# Patient Record
Sex: FEMALE | Race: WHITE | NOT HISPANIC OR LATINO | Employment: FULL TIME | URBAN - METROPOLITAN AREA
[De-identification: names, ages, dates, MRNs, and addresses within clinical notes are randomized per-mention and may not be internally consistent; named-entity substitution may affect disease eponyms.]

---

## 2017-01-22 PROBLEM — L30.9 DERMATITIS DUE TO UNKNOWN CAUSE: Status: ACTIVE | Noted: 2017-01-22

## 2017-01-22 PROBLEM — F41.9 ANXIETY, MILD: Status: ACTIVE | Noted: 2017-01-22

## 2017-03-30 PROBLEM — G89.29 CHRONIC MIDLINE LOW BACK PAIN WITHOUT SCIATICA: Status: ACTIVE | Noted: 2017-03-30

## 2017-03-30 PROBLEM — F43.9 STRESS: Status: ACTIVE | Noted: 2017-03-30

## 2017-03-30 PROBLEM — M54.50 CHRONIC MIDLINE LOW BACK PAIN WITHOUT SCIATICA: Status: ACTIVE | Noted: 2017-03-30

## 2017-08-10 ENCOUNTER — TELEPHONE (OUTPATIENT)
Dept: PHYSICAL MEDICINE AND REHAB | Facility: CLINIC | Age: 67
End: 2017-08-10

## 2017-08-10 NOTE — TELEPHONE ENCOUNTER
Spoke with patient advised that I do not have any orders in computer for emg for her. I sent a fax to dr Tad Delgado' office requesting an order for emg be sent over for patient.

## 2017-08-10 NOTE — TELEPHONE ENCOUNTER
----- Message from Willa Flaherty sent at 8/10/2017 10:13 AM CDT -----  Dr. Tad Delgado said he referred patient to office to have a nerve conductive study on her left hand. She has been waiting for a week to hear from office. Please call back to schedule at [phone].

## 2017-08-10 NOTE — TELEPHONE ENCOUNTER
Patient called states there is supposed to be an order for a nerve conduction/ emg for her. I do not see one in EPIC please place order and I can schedule. Or fax to [phone]

## 2017-08-14 DIAGNOSIS — M54.2 CERVICALGIA: Primary | ICD-10-CM

## 2017-08-17 ENCOUNTER — PROCEDURE VISIT (OUTPATIENT)
Dept: PHYSICAL MEDICINE AND REHAB | Facility: CLINIC | Age: 67
End: 2017-08-17
Payer: COMMERCIAL

## 2017-08-17 DIAGNOSIS — M54.2 CERVICALGIA: ICD-10-CM

## 2017-08-17 DIAGNOSIS — G56.03 CARPAL TUNNEL SYNDROME ON BOTH SIDES: Primary | ICD-10-CM

## 2017-08-17 DIAGNOSIS — G62.9 PERIPHERAL POLYNEUROPATHY: ICD-10-CM

## 2017-08-17 PROCEDURE — 95911 NRV CNDJ TEST 9-10 STUDIES: CPT | Mod: 26,,, | Performed by: PHYSICAL MEDICINE & REHABILITATION

## 2017-08-17 PROCEDURE — 95886 MUSC TEST DONE W/N TEST COMP: CPT | Mod: 26,,, | Performed by: PHYSICAL MEDICINE & REHABILITATION

## 2017-08-18 NOTE — PROCEDURES
[facility]  [address] [facility] [address]  [place] [address]             Full Name: CARLOS LYNN Gender: Female  Patient ID: [id number] YOB: 1950  History: Pt reports hx of carpal tunnel syndrome surgery on both sides many years ago with persistent paresthesias of the left hand. She also reports some paresthesias in the shoulders.      Visit Date: 8/17/2017 09:44  Age: 67 Years 4 Months Old  Examining Physician: JENI  Referring Physician: TONIA YEUNG MD      Sensory NCS      Nerve / Sites Rec. Site Onset Lat Peak Lat NP Amp PP Amp Segments Distance Velocity     ms ms µV µV  cm m/s   L Median - Digit III (Antidromic)      Wrist Dig III NR NR NR NR Wrist - Dig III 14 NR      Mid palm Dig III NR NR NR NR Mid palm - Dig III 7 NR   R Median - Digit III (Antidromic)      Wrist Dig III 4.43 5.21 4.0 17.9 Wrist - Dig III 14 32      Mid palm Dig III 1.88 2.55 16.3 6.9 Mid palm - Dig III 7 37   L Ulnar - Digit V (Antidromic)      Wrist Dig V 3.02 3.91 16.5 10.9 Wrist - Dig V 14 46   R Ulnar - Digit V (Antidromic)      Wrist Dig V 2.92 3.80 11.7 13.7 Wrist - Dig V 14 48   L Radial - Anatomical snuff box (Forearm)      Forearm Wrist 1.93 2.81 23.4 10.8 Forearm - Wrist 10 52   L Sural - Ankle (Calf)      Calf Ankle NR NR NR NR Calf - Ankle 14 NR       Motor NCS      Nerve / Sites Muscle Latency Amplitude Amp % Duration Segments Distance Lat Diff Velocity     ms mV % ms  cm ms m/s   R Median - APB      Wrist APB 6.46 5.5 100 7.03 Wrist - APB 8        Elbow APB 11.51 9.8 180 7.34 Elbow - Wrist 24 5.05 48   L Median - APB      Wrist APB 11.88 1.6 100  Wrist - APB 8        Elbow APB 20.89 1.0 66.6 12.50 Elbow - Wrist 24 9.01 27   R Ulnar - ADM      Wrist ADM 3.54 6.9 100 6.56 Wrist - ADM 8        B.Elbow ADM 6.98 5.8 82.9 7.03 B.Elbow - Wrist 17 3.44 49      A.Elbow ADM 8.96 5.7 81.6 6.41 A.Elbow - B.Elbow 10 1.98 51   L Ulnar - ADM      Wrist ADM 3.39 7.4 100 7.34 Wrist - ADM 8        B.Elbow ADM 6.77 6.6  88.9 7.86 B.Elbow - Wrist 17 3.39 50      A.Elbow ADM 8.80 6.4 86.6 7.97 A.Elbow - B.Elbow 10 2.03 49       EMG         EMG Summary Table     Spontaneous MUAP Recruitment   Muscle IA Fib PSW Fasc H.F. Amp Dur. PPP Pattern   L. Deltoid N None None None None N N N N   L. Biceps brachii N None None None None N N N N   L. Triceps brachii N None None None None N N N N   L. Pronator teres N None None None None N N N N   L. First dorsal interosseous N None None None None N N N N   L. Abductor pollicis brevis N None 1+ None None N 1+ N N   L. Cervical paraspinals N None None None None       R. Deltoid N None None None None N N N N   R. Biceps brachii N None None None None N N N N   R. Triceps brachii N None None None None N N N N   R. Pronator teres N None None None None N N N N   R. First dorsal interosseous N None None None None N N N N   R. Abductor pollicis brevis N None None None None N N N N   R. Cervical paraspinals N None None None None           Summary    The motor conduction test was performed on 4 nerve(s). The results were normal in 2 nerve(s): R Ulnar - ADM, L Ulnar - ADM. Results outside the specified normal range were found in 2 nerve(s), as follows:   In the R Median - APB study  o the take off latency result was increased for Wrist stimulation  o the take off velocity result was reduced for Elbow - Wrist segment   In the L Median - APB study  o the take off latency result was increased for Wrist stimulation  o the peak amplitude result was reduced for Wrist stimulation  o the take off velocity result was reduced for Elbow - Wrist segment    The sensory conduction test was performed on 6 nerve(s). The results were normal in 1 nerve(s): L Radial - Anatomical snuff box (Forearm). Results outside the specified normal range were found in 5 nerve(s), as follows:   In the L Median - Digit III (Antidromic) study  o the response was considered absent for Wrist stimulation  o the response was considered absent  for Mid palm stimulation   In the R Median - Digit III (Antidromic) study  o the peak latency result was increased for Wrist stimulation  o the peak amplitude result was reduced for Wrist stimulation   In the L Ulnar - Digit V (Antidromic) study  o the peak latency result was increased for Wrist stimulation   In the R Ulnar - Digit V (Antidromic) study  o the peak latency result was increased for Wrist stimulation   In the L Sural - Ankle (Calf) study  o the response was considered absent for Calf stimulation    The needle EMG examination was performed in 14 muscles. It was normal in 13 muscle(s): L. Deltoid, L. Biceps brachii, L. Triceps brachii, L. Pronator teres, L. First dorsal interosseous, L. Cervical paraspinals, R. Deltoid, R. Biceps brachii, R. Triceps brachii, R. Pronator teres, R. First dorsal interosseous, R. Abductor pollicis brevis, R. Cervical paraspinals. The study was abnormal in 1 muscle(s), with the following distribution:   The L. Abductor pollicis brevis had abnormal spontaneous activity, abnormal MUP waveforms.      Electrodiagnostic Impression:  1. There was electrophysiologic evidence of distal, predominantly sensory polyneuropathy.  2. There was electrophysiologic evidence of chronic profoundly severe median nerve neuropathy at the left wrist.  Needle EMG findings of abnormal spontaneous activity found in the median nerve innervated abductor pollicis brevis muscle of the left hand indicates active axonal denervation.  3. There was electrophysiologic evidence of median nerve neuropathy at the right wrist.  The severity level is likely moderate on this side, however the presence of concurrent peripheral polyneuropathy limits more accurate assessment.  4. There was insufficient electrodiagnostic evidence for diagnoses of myopathy or active axonal cervical radiculopathy/brachial plexopathy of the bilateral upper extremities.    Summary:  1. Peripheral polyneuropathy, predominantly  sensory.  2. Chronic left carpal tunnel syndrome with active axonal denervation, likely profoundly severe.  3. Right carpal tunnel syndrome, likely moderate.    Recommendations:  Todays test results will be sent to her referring physician, Dr. Delgado for further review and direction in her treatment.  It should be noted that electromyographic studies have diagnostic limitations for conditions such as C3/C4 radiculopathy, demyelinating only radiculopathy, sensory radiculopathy, and radiculitis.  EMG testing may be normal in these conditions, therefore clinical correlation remains important.  If clinical suspicion warrants, select repeat electrodiagnostic studies could be considered after a 4-6 week timeframe.    Thank you very much for the referral. Please call if you have any questions regarding this study or the report.       -------------------------------  Sam Daily M.D.

## 2017-08-22 PROBLEM — M79.601 PARESTHESIA AND PAIN OF BOTH UPPER EXTREMITIES: Status: ACTIVE | Noted: 2017-08-22

## 2017-08-22 PROBLEM — M50.30 DDD (DEGENERATIVE DISC DISEASE), CERVICAL: Status: ACTIVE | Noted: 2017-08-22

## 2017-08-22 PROBLEM — M79.602 PARESTHESIA AND PAIN OF BOTH UPPER EXTREMITIES: Status: ACTIVE | Noted: 2017-08-22

## 2017-08-22 PROBLEM — R20.2 PARESTHESIA AND PAIN OF BOTH UPPER EXTREMITIES: Status: ACTIVE | Noted: 2017-08-22

## 2017-08-31 ENCOUNTER — TELEPHONE (OUTPATIENT)
Dept: PHYSICAL MEDICINE AND REHAB | Facility: CLINIC | Age: 67
End: 2017-08-31

## 2017-08-31 NOTE — TELEPHONE ENCOUNTER
----- Message from Willa Flaherty sent at 8/31/2017  1:16 PM CDT -----  Patient is calling very frustrated because office was supposed to send her test results to Dr. Delgado. She did not have the fax number. She states that she never gets this type of treatment from the doctors at [facility]. Please call to advise when completed at [phone]. She also wants a copy sent to Dr. Ann Miguel at fax [phone].

## 2017-09-07 PROBLEM — L30.4 INTERTRIGO: Status: ACTIVE | Noted: 2017-09-07

## 2018-01-05 PROBLEM — R94.39 ABNORMAL CARDIOVASCULAR STRESS TEST: Status: ACTIVE | Noted: 2018-01-05

## 2018-01-05 PROBLEM — S52.571A: Status: ACTIVE | Noted: 2018-01-05

## 2018-01-05 PROBLEM — S42.221A CLOSED 2-PART DISPLACED FRACTURE OF SURGICAL NECK OF RIGHT HUMERUS: Status: ACTIVE | Noted: 2018-01-05

## 2018-01-10 PROBLEM — R94.39 ABNORMAL STRESS TEST: Status: ACTIVE | Noted: 2018-01-10

## 2018-01-10 PROBLEM — S52.571A: Status: ACTIVE | Noted: 2018-01-10

## 2018-01-29 PROBLEM — J30.89 PERENNIAL ALLERGIC RHINITIS WITH SEASONAL VARIATION: Status: ACTIVE | Noted: 2018-01-29

## 2018-01-29 PROBLEM — J30.2 PERENNIAL ALLERGIC RHINITIS WITH SEASONAL VARIATION: Status: ACTIVE | Noted: 2018-01-29

## 2018-02-12 PROBLEM — I25.84 CORONARY ARTERY DISEASE DUE TO CALCIFIED CORONARY LESION: Status: ACTIVE | Noted: 2018-02-12

## 2018-02-12 PROBLEM — I25.10 CORONARY ARTERY DISEASE DUE TO CALCIFIED CORONARY LESION: Status: ACTIVE | Noted: 2018-02-12

## 2018-02-28 PROBLEM — M25.639 DECREASED RANGE OF MOTION OF WRIST: Status: ACTIVE | Noted: 2018-02-28

## 2018-02-28 PROBLEM — M25.611 DECREASED RANGE OF MOTION OF RIGHT SHOULDER: Status: ACTIVE | Noted: 2018-02-28

## 2018-04-11 PROBLEM — R21 RASH, SKIN: Status: ACTIVE | Noted: 2018-04-11

## 2018-06-19 PROBLEM — R42 DIZZINESS: Status: ACTIVE | Noted: 2018-06-19

## 2018-07-24 PROBLEM — R94.6 ABNORMAL RESULTS OF THYROID FUNCTION STUDIES: Status: ACTIVE | Noted: 2018-07-24

## 2018-10-01 PROBLEM — R93.89 ABNORMAL PELVIC ULTRASOUND: Status: ACTIVE | Noted: 2018-10-01

## 2018-11-02 PROBLEM — E55.9 VITAMIN D DEFICIENCY: Status: ACTIVE | Noted: 2018-11-02

## 2018-11-02 PROBLEM — E78.00 HYPERCHOLESTEROLEMIA: Status: ACTIVE | Noted: 2018-11-02

## 2019-04-16 PROBLEM — Z95.5 PRESENCE OF STENT IN LAD CORONARY ARTERY: Status: ACTIVE | Noted: 2019-04-16

## 2019-05-29 ENCOUNTER — TELEPHONE (OUTPATIENT)
Dept: PODIATRY | Facility: CLINIC | Age: 69
End: 2019-05-29

## 2019-05-29 NOTE — TELEPHONE ENCOUNTER
----- Message from Lion Carter sent at 5/29/2019  1:16 PM CDT -----  Type:  Patient Call Back    Who Called:  pt  Who Left Message for Patient: nurse  Does the patient know what this is regarding?: returning the phone call  Best Call Back Number:  [phone]  Additional Information:  Please call pt and leave a detailed message if there is no answer.

## 2019-05-29 NOTE — TELEPHONE ENCOUNTER
----- Message from Gi Gallegos sent at 5/29/2019  8:35 AM CDT -----  Contact: self  Patient needs first available appointment due to wound on her left foot not healing. Patient is diabetic. Patient would like to see Dr Pierre. Please call patient at [phone]. Thanks!

## 2019-07-30 ENCOUNTER — OFFICE VISIT (OUTPATIENT)
Dept: PODIATRY | Facility: CLINIC | Age: 69
End: 2019-07-30
Payer: COMMERCIAL

## 2019-07-30 VITALS
BODY MASS INDEX: 47.21 KG/M2 | WEIGHT: 283.38 LBS | DIASTOLIC BLOOD PRESSURE: 69 MMHG | SYSTOLIC BLOOD PRESSURE: 142 MMHG | HEIGHT: 65 IN | HEART RATE: 69 BPM

## 2019-07-30 DIAGNOSIS — R60.0 PERIPHERAL EDEMA: ICD-10-CM

## 2019-07-30 DIAGNOSIS — E66.01 MORBID OBESITY WITH BMI OF 45.0-49.9, ADULT: ICD-10-CM

## 2019-07-30 DIAGNOSIS — B35.1 ONYCHOMYCOSIS DUE TO DERMATOPHYTE: ICD-10-CM

## 2019-07-30 DIAGNOSIS — E11.42 DIABETIC POLYNEUROPATHY ASSOCIATED WITH TYPE 2 DIABETES MELLITUS: Primary | ICD-10-CM

## 2019-07-30 PROCEDURE — 99999 PR PBB SHADOW E&M-EST. PATIENT-LVL V: CPT | Mod: PBBFAC,,, | Performed by: PODIATRIST

## 2019-07-30 PROCEDURE — 99999 PR PBB SHADOW E&M-EST. PATIENT-LVL V: ICD-10-PCS | Mod: PBBFAC,,, | Performed by: PODIATRIST

## 2019-07-30 PROCEDURE — 99213 OFFICE O/P EST LOW 20 MIN: CPT | Mod: 25,S$GLB,, | Performed by: PODIATRIST

## 2019-07-30 PROCEDURE — 99213 PR OFFICE/OUTPT VISIT, EST, LEVL III, 20-29 MIN: ICD-10-PCS | Mod: 25,S$GLB,, | Performed by: PODIATRIST

## 2019-07-30 PROCEDURE — 11721 PR DEBRIDEMENT OF NAILS, 6 OR MORE: ICD-10-PCS | Mod: Q9,S$GLB,, | Performed by: PODIATRIST

## 2019-07-30 PROCEDURE — 11721 DEBRIDE NAIL 6 OR MORE: CPT | Mod: Q9,S$GLB,, | Performed by: PODIATRIST

## 2019-07-31 NOTE — PROGRESS NOTES
"Subjective:      Patient ID: Alana Snow is a 69 y.o. female.    Chief Complaint: Foot Problem (swelling - left foot); Foot Pain (left ); Diabetes Mellitus (PCP:  Dr Yun  7/10/19; HgbA1c: 6/29/19  7.9); and Nail Care    Alana is a 69 y.o. female who presents to the clinic for evaluation and treatment of diabetic feet. Alana has a past medical history of AVM (arteriovenous malformation) of small bowel, acquired, Cataract, Colon polyp (01/11/2016), Coronary artery disease, Diabetes mellitus, type 2, DJD (degenerative joint disease) of knee, DJD (degenerative joint disease) of knee (3/31/2016), Drop foot gait, Hypertension, Internal hemorrhoid (01/11/2016), and Plantar fasciitis. Patient relates no major problem with feet. Only complaints today consist of swelling of the lower extremities.  Relates this being an ongoing issue.  Denies the sensation of "heaviness" or aching in the limbs with weight bearing.  Notes spending a large portion of her time seated on account of her occupation.  Has attempted to self treat with wearing compression stockings, however, notes difficulty with donning said devices.  Denies any additional pedal complaints.       PCP: Wandy Yun MD    Date Last Seen by PCP: 7/10/19    Hemoglobin A1C   Date Value Ref Range Status   06/29/2019 7.9 (H) 0.0 - 5.6 % Final     Comment:     Reference Interval:  5.0 - 5.6 Normal   5.7 - 6.4 High Risk   > 6.5 Diabetic    Hgb A1c results are standardized based on the (NGSP) National   Glycohemoglobin Standardization Program.    Hemoglobin A1C levels are related to mean serum/plasma glucose   during the preceding 2-3 months.        02/01/2019 7.1 (H) 0.0 - 5.6 % Final     Comment:     Reference Interval:  5.0 - 5.6 Normal   5.7 - 6.4 High Risk   > 6.5 Diabetic    Hgb A1c results are standardized based on the (NGSP) National   Glycohemoglobin Standardization Program.    Hemoglobin A1C levels are related to mean serum/plasma glucose   during the " preceding 2-3 months.        09/22/2018 6.9 (H) 0.0 - 5.6 % Final     Comment:     Reference Interval:  5.0 - 5.6 Normal   5.7 - 6.4 High Risk   > 6.5 Diabetic    Hgb A1c results are standardized based on the (NGSP) National   Glycohemoglobin Standardization Program.    Hemoglobin A1C levels are related to mean serum/plasma glucose   during the preceding 2-3 months.                Past Medical History:   Diagnosis Date    AVM (arteriovenous malformation) of small bowel, acquired     Cataract     Colon polyp 01/11/2016    Coronary artery disease     Diabetes mellitus, type 2     DJD (degenerative joint disease) of knee     left    DJD (degenerative joint disease) of knee 3/31/2016    Drop foot gait     right    Hypertension     Internal hemorrhoid 01/11/2016    Plantar fasciitis        Past Surgical History:   Procedure Laterality Date    ABDOMINAL SURGERY      Cholecystectomy    APPLICATION-EXTERNAL FIXATION DEVICE; Closed Reduction Distal Radius Right 1/10/2018    [facility]formed [facility] Suzanne Ralph [facility]    BREAST BIOPSY Right     excisional over 10 yrs. benign    BREAST CYST ASPIRATION      CARPAL TUNNEL RELEASE Left 6/26/10    per Dr. Ramirez    CARPAL TUNNEL RELEASE Right 8/14/10    per Dr. Ramirez    COLON SURGERY      sigmoidectomy    COLONOSCOPY N/A 1/11/2016    Performed by Blair López MD at [facility] ENDO    CORONARY STENT PLACEMENT      Left heart cath Left 1/10/2018    Performed by Moreno Wild III, [facility] at [facility] CATH    PILONIDAL CYST DRAINAGE      WRIST FRACTURE SURGERY Right 01/10/2018    clsd reduction w/application ex-fix       Family History   Problem Relation Age of Onset    Diabetes Mother     Cancer Father     Coronary artery disease Father     Breast cancer Maternal Aunt     Breast cancer Paternal Grandmother        Social History     Socioeconomic History    Marital status:      Spouse name: Not on file    Number of children: Not on file    Years of  education: Not on file    Highest education level: Not on file   Occupational History    Not on file   Social Needs    Financial resource strain: Somewhat hard    Food insecurity:     Worry: Sometimes true     Inability: Sometimes true    Transportation needs:     Medical: No     Non-medical: No   Tobacco Use    Smoking status: Never Smoker    Smokeless tobacco: Never Used   Substance and Sexual Activity    Alcohol use: No     Alcohol/week: 0.0 oz     Frequency: Never     Binge frequency: Never    Drug use: No    Sexual activity: Not on file   Lifestyle    Physical activity:     Days per week: 1 day     Minutes per session: 20 min    Stress: To some extent   Relationships    Social connections:     Talks on phone: More than three times a week     Gets together: Three times a week     Attends [employer] service: Not on file     Active member of club or organization: Yes     Attends meetings of clubs or organizations: More than 4 times per year     Relationship status:    Other Topics Concern    Not on file   Social History Narrative    Not on file       Current Outpatient Medications   Medication Sig Dispense Refill    aspirin (ECOTRIN) 81 MG EC tablet Take 1 tablet (81 mg total) by mouth once daily.  0    atorvastatin (LIPITOR) 40 MG tablet TAKE 1 TABLET BY MOUTH once DAILY 90 tablet 3    blood sugar diagnostic (FREESTYLE LITE STRIPS) Strp use to teSt TWICE DAILY 200 strip 3    blood-glucose meter (FREESTYLE LITE METER) kit Use as instructed 1 each 0    C,E,zinc,copper 11-omega3s-lut (OCUVITE ADULT 50 PLUS) 250-5-1 mg Cap Take by mouth.      cholecalciferol, vitamin D3, (VITAMIN D3) 5,000 unit Tab Take 2 on MWF and 1 the other days      citalopram (CELEXA) 20 MG tablet Take 1 tablet (20 mg total) by mouth once daily. 90 tablet 3    clopidogrel (PLAVIX) 75 mg tablet TAKE 1 TABLET BY MOUTH once DAILY 90 tablet 3    coQ10, ubiquinol, 100 mg Cap Take 1 capsule by mouth once daily.        "glimepiride (AMARYL) 4 MG tablet Take 1 tablet (4 mg total) by mouth daily with breakfast. 90 tablet 1    insulin (LANTUS SOLOSTAR U-100 INSULIN) glargine 100 units/mL (3mL) SubQ pen Inject 20 Units into the skin every evening. 15 mL 5    iron fum,ps-FA-vit B,C#18-Lact 130 mg iron -1,250 mcg Cap Take 1 tablet by mouth every other day.      lansoprazole (PREVACID) 30 MG capsule Take 1 capsule (30 mg total) by mouth once daily. 90 capsule 3    METANX, ALGAL OIL, 3 mg-35 mg-2 mg -90.314 mg Cap Take 1 capsule by mouth 2 (two) times daily. 180 capsule 0    pen needle, diabetic (SURE COMFORT PEN NEEDLE) 32 gauge x 5/32" Ndle use as directed with lantus 200 each 6    pen needle, diabetic (SURE COMFORT PEN NEEDLE) 32 gauge x 5/32" Ndle Sure Comfort Pen Needle 32 gauge x 5/32"   use as directed with lantus for a 30 day supply      SITagliptin (JANUVIA) 100 MG Tab Take 1 tablet (100 mg total) by mouth once daily. 90 tablet 1    chrom edgar/brindal berry (GARCINIA CAMBOGIA ORAL) Take 250 mg by mouth 3 (three) times daily before meals.       No current facility-administered medications for this visit.        Review of patient's allergies indicates:   Allergen Reactions    Bacitracin Dermatitis     Tested positive for contact allergy to bacitracin (should also avoid neomycin, and polymixin if possible)--marino Obrien, 2015    Balsam peru Rash     Tested positive for contact allergy to Balsam of peru,used in flavorings and fragrances--had very pruritic, extensive rash but not clear which products provoked it.  marino Obrien, 2015    Epoxy resin Rash     Tested positive for contact allergy to epoxy resin--also to Carba mix, involved in rubber mfg--but couldn't pull that up     Ethylenediamine (bulk) Rash     Tested positive for contact allergy to ethylenediamine dihydrochloride, Marino Obrien, 2015--after prolonged pruritic rash---often present in fungicides, antibiotic creams, eye and nose drops.    " Gold sodium thiosulfate Rash     Tested positive for contact allergy by Dr Smalls, summer, 2015--after having severe pruritic rashes    Latex, natural rubber Rash    Metformin Diarrhea     Really affected things    Sulfa (sulfonamide antibiotics) Other (See Comments)     Affects liver    Tixocortol pivalate Rash     --tested positive for contact allergy to tixocortol-21-pivalate, commonly found in topical anti-inflammatory products and related corticosteroids    Chromium and derivatives Rash    Gold salts     Losartan Rash         Review of Systems   Constitution: Negative for chills and fever.   Cardiovascular: Positive for leg swelling. Negative for claudication.   Skin: Positive for color change and nail changes.   Musculoskeletal: Positive for joint swelling. Negative for joint pain, muscle cramps, muscle weakness and myalgias.   Neurological: Positive for numbness. Negative for paresthesias.   Psychiatric/Behavioral: Negative for altered mental status.           Objective:      Physical Exam   Constitutional: She is oriented to person, place, and time. She appears well-developed and well-nourished. No distress.   Cardiovascular:   Pulses:       Dorsalis pedis pulses are 2+ on the right side, and 2+ on the left side.        Posterior tibial pulses are 2+ on the right side, and 2+ on the left side.   CFT is < 3 seconds bilateral.  Pedal hair growth is decreased bilateral.  Varicosities noted bilateral.  1+ pitting edema noted to bilateral lower extremity.  Toes are cool to touch bilateral.     Musculoskeletal: She exhibits edema. She exhibits no tenderness.   Muscle strength 5/5 in all muscle groups bilateral.  No tenderness nor crepitation with ROM of foot/ankle joints bilateral.  No tenderness with palpation of bilateral foot and ankle.  Bilateral pes planus foot type.  Rectus alignment of all toes bilateral.     Neurological: She is alert and oriented to person, place, and time. She has normal  strength. A sensory deficit is present.   Protective sensation per [place] monofilament is decreased bilateral.  Vibratory sensation is decreased bilateral.  Light touch is intact bilateral.   Skin: Skin is warm, dry and intact. Capillary refill takes 2 to 3 seconds. No abrasion, no bruising, no burn, no ecchymosis, no lesion and no rash noted. She is not diaphoretic. No erythema. No pallor.   Pedal skin appears edematous bilateral.  Toenails x 10 appear thickened by 2 mm's, elongated by 2 mm's, and discolored with subungual debris. Examination of the skin reveals no evidence of significant maceration, rashes, open lesions, suspicious appearing nevi or other concerning lesions.              Assessment:       Encounter Diagnoses   Name Primary?    Peripheral edema     Diabetic polyneuropathy associated with type 2 diabetes mellitus Yes    Onychomycosis due to dermatophyte     Morbid obesity with BMI of 45.0-49.9, adult          Plan:       Alana was seen today for foot problem, foot pain, diabetes mellitus and nail care.    Diagnoses and all orders for this visit:    Diabetic polyneuropathy associated with type 2 diabetes mellitus    Peripheral edema  -     US Lower Extremity Veins Bilateral Insufficiency; Future    Onychomycosis due to dermatophyte    Morbid obesity with BMI of 45.0-49.9, adult      I counseled the patient on her conditions, their implications and medical management.    Venous ultrasound to rule out venous insufficiency of bilateral LE.    Discussed the importance of diet and exercise as they pertain to diabetes management and maintaining a healthy BMI.    Shoe inspection. Diabetic Foot Education. Patient reminded of the importance of good nutrition and blood sugar control to help prevent podiatric complications of diabetes. Patient instructed on proper foot hygeine. We discussed wearing proper shoe gear, daily foot inspections, never walking without protective shoe gear, never  putting sharp instruments to feet    With patient's permission, nails were aggressively reduced and debrided x 10 to their soft tissue attachment mechanically and with electric , removing all offending nail and debris. Patient relates relief following the procedure. She will continue to monitor the areas daily, inspect her feet, wear protective shoe gear when ambulatory, moisturizer to maintain skin integrity and follow in this office in approximately 2-3 months, sooner p.r.n.    Follow up in about 3 months (around 10/30/2019).    Sandoval Pierre DPM

## 2019-08-05 ENCOUNTER — HOSPITAL ENCOUNTER (OUTPATIENT)
Dept: RADIOLOGY | Facility: HOSPITAL | Age: 69
Discharge: HOME | End: 2019-08-05
Attending: PODIATRIST
Payer: COMMERCIAL

## 2019-08-05 DIAGNOSIS — R60.0 PERIPHERAL EDEMA: ICD-10-CM

## 2019-08-05 PROCEDURE — 93970 US LOWER EXTREMITY VEINS BILATERAL INSUFFICIENCY: ICD-10-PCS | Mod: 26,,, | Performed by: RADIOLOGY

## 2019-08-05 PROCEDURE — 93970 EXTREMITY STUDY: CPT | Mod: 26,,, | Performed by: RADIOLOGY

## 2019-08-05 PROCEDURE — 93970 EXTREMITY STUDY: CPT | Mod: TC,PO

## 2019-08-07 ENCOUNTER — TELEPHONE (OUTPATIENT)
Dept: PODIATRY | Facility: CLINIC | Age: 69
End: 2019-08-07

## 2019-08-07 NOTE — TELEPHONE ENCOUNTER
----- Message from Sandoval Pierre DPM sent at 8/5/2019  3:53 PM CDT -----  Hey Ladies,    Please let her know that the ultrasound was positive for reflux in her veins.  See if interested in seeing Dr. Morales at the vein clinic.  Thanks!    Dr. Pierre

## 2019-08-07 NOTE — TELEPHONE ENCOUNTER
Patient was notified a refferal has been sent to Dr Morales. They will call to schedule an appointment.

## 2019-08-08 ENCOUNTER — TELEPHONE (OUTPATIENT)
Dept: PODIATRY | Facility: CLINIC | Age: 69
End: 2019-08-08

## 2019-08-09 DIAGNOSIS — I87.2 VENOUS INSUFFICIENCY: ICD-10-CM

## 2019-08-09 DIAGNOSIS — R60.0 PERIPHERAL EDEMA: Primary | ICD-10-CM

## 2019-08-12 NOTE — TELEPHONE ENCOUNTER
Patient informed that the referral is in the system and that she should hear from someone in that department within a few days.  If not, she will call for an appointment.

## 2019-10-08 ENCOUNTER — PATIENT MESSAGE (OUTPATIENT)
Dept: PODIATRY | Facility: CLINIC | Age: 69
End: 2019-10-08

## 2019-10-08 ENCOUNTER — TELEPHONE (OUTPATIENT)
Dept: VASCULAR SURGERY | Facility: CLINIC | Age: 69
End: 2019-10-08

## 2019-10-08 NOTE — TELEPHONE ENCOUNTER
----- Message from Hannah Chesterfield sent at 10/8/2019  8:37 AM CDT -----  Contact: self  Type: Appointment Request    Patient referred by Dr MOON Pierre to see Vascular for Peripheral edema  I87.2 (ICD-10-CM) - Venous insufficiency.   Patient states want to see Dr Bucio    Name of Caller:   Patient   When is the first available appointment?  Symptoms  Would the patient rather a call back or a response via [facility]? call  Best Call Back Number:  [phone]  Additional Information: I was not sure of appointment

## 2019-10-08 NOTE — TELEPHONE ENCOUNTER
Patient notified that Dr Ricks does not treat venous insufficiency. We have no one on the [facility] that treats this at this time. Patient was given Dr Pickard and Dr Bills at the [facility]

## 2019-12-09 ENCOUNTER — OFFICE VISIT (OUTPATIENT)
Dept: PODIATRY | Facility: CLINIC | Age: 69
End: 2019-12-09
Payer: COMMERCIAL

## 2019-12-09 VITALS — HEIGHT: 64 IN | WEIGHT: 283.06 LBS | BODY MASS INDEX: 48.32 KG/M2

## 2019-12-09 DIAGNOSIS — E11.42 DIABETIC POLYNEUROPATHY ASSOCIATED WITH TYPE 2 DIABETES MELLITUS: Primary | ICD-10-CM

## 2019-12-09 DIAGNOSIS — B35.1 ONYCHOMYCOSIS DUE TO DERMATOPHYTE: ICD-10-CM

## 2019-12-09 DIAGNOSIS — R60.0 PERIPHERAL EDEMA: ICD-10-CM

## 2019-12-09 DIAGNOSIS — I87.2 VENOUS INSUFFICIENCY: ICD-10-CM

## 2019-12-09 DIAGNOSIS — E66.01 MORBID OBESITY WITH BMI OF 45.0-49.9, ADULT: ICD-10-CM

## 2019-12-09 PROCEDURE — 99499 UNLISTED E&M SERVICE: CPT | Mod: S$GLB,,, | Performed by: PODIATRIST

## 2019-12-09 PROCEDURE — 11721 PR DEBRIDEMENT OF NAILS, 6 OR MORE: ICD-10-PCS | Mod: S$GLB,,, | Performed by: PODIATRIST

## 2019-12-09 PROCEDURE — 99499 NO LOS: ICD-10-PCS | Mod: S$GLB,,, | Performed by: PODIATRIST

## 2019-12-09 PROCEDURE — 11721 DEBRIDE NAIL 6 OR MORE: CPT | Mod: S$GLB,,, | Performed by: PODIATRIST

## 2019-12-09 PROCEDURE — 99999 PR PBB SHADOW E&M-EST. PATIENT-LVL III: CPT | Mod: PBBFAC,,, | Performed by: PODIATRIST

## 2019-12-09 PROCEDURE — 99999 PR PBB SHADOW E&M-EST. PATIENT-LVL III: ICD-10-PCS | Mod: PBBFAC,,, | Performed by: PODIATRIST

## 2019-12-10 NOTE — PROGRESS NOTES
"Subjective:      Patient ID: Alana Snow is a 69 y.o. female.    Chief Complaint: Diabetes Mellitus (7.9 Yun) and Diabetic Foot Exam    Alana is a 69 y.o. female who presents to the clinic for evaluation and treatment of diabetic feet. Alana has a past medical history of AVM (arteriovenous malformation) of small bowel, acquired, Cataract, Colon polyp (01/11/2016), Coronary artery disease, Diabetes mellitus, type 2, DJD (degenerative joint disease) of knee, DJD (degenerative joint disease) of knee (3/31/2016), Drop foot gait, Hypertension, Internal hemorrhoid (01/11/2016), and Plantar fasciitis. Patient relates no major problem with feet. Only complaints today consist of continued swelling of the lower extremities.  She continues to notice both "heaviness" and aching in the limbs with weight bearing.  Notes having been instructed by Cardiology to obtain a pair of compression stockings with 20-30 mmHg of compression.  Denies any additional pedal complaints.       PCP: Wandy Yun MD    Date Last Seen by PCP: 9/18/19    Hemoglobin A1C   Date Value Ref Range Status   06/29/2019 7.9 (H) 0.0 - 5.6 % Final     Comment:     Reference Interval:  5.0 - 5.6 Normal   5.7 - 6.4 High Risk   > 6.5 Diabetic    Hgb A1c results are standardized based on the (NGSP) National   Glycohemoglobin Standardization Program.    Hemoglobin A1C levels are related to mean serum/plasma glucose   during the preceding 2-3 months.        02/01/2019 7.1 (H) 0.0 - 5.6 % Final     Comment:     Reference Interval:  5.0 - 5.6 Normal   5.7 - 6.4 High Risk   > 6.5 Diabetic    Hgb A1c results are standardized based on the (NGSP) National   Glycohemoglobin Standardization Program.    Hemoglobin A1C levels are related to mean serum/plasma glucose   during the preceding 2-3 months.        09/22/2018 6.9 (H) 0.0 - 5.6 % Final     Comment:     Reference Interval:  5.0 - 5.6 Normal   5.7 - 6.4 High Risk   > 6.5 Diabetic    Hgb A1c results are " standardized based on the (NGSP) National   Glycohemoglobin Standardization Program.    Hemoglobin A1C levels are related to mean serum/plasma glucose   during the preceding 2-3 months.                Past Medical History:   Diagnosis Date    AVM (arteriovenous malformation) of small bowel, acquired     Cataract     Colon polyp 01/11/2016    Coronary artery disease     Diabetes mellitus, type 2     DJD (degenerative joint disease) of knee     left    DJD (degenerative joint disease) of knee 3/31/2016    Drop foot gait     right    Hypertension     Internal hemorrhoid 01/11/2016    Plantar fasciitis        Past Surgical History:   Procedure Laterality Date    ABDOMINAL SURGERY      Cholecystectomy    BREAST BIOPSY Right     excisional over 10 yrs. benign    BREAST CYST ASPIRATION      CARPAL TUNNEL RELEASE Left 6/26/10    per Dr. Ramirez    CARPAL TUNNEL RELEASE Right 8/14/10    per Dr. Ramirez    COLON SURGERY      sigmoidectomy    COLONOSCOPY N/A 1/11/2016    Procedure: COLONOSCOPY;  Surgeon: Blair López MD;  Location: [facility];  Service: Endoscopy;  Laterality: N/A;    CORONARY STENT PLACEMENT      PILONIDAL CYST DRAINAGE      WRIST FRACTURE SURGERY Right 01/10/2018    clsd reduction w/application ex-fix       Family History   Problem Relation Age of Onset    Diabetes Mother     Cancer Father     Coronary artery disease Father     Breast cancer Paternal Grandmother         age unknown    Breast cancer Paternal Aunt         age unknown       Social History     Socioeconomic History    Marital status:      Spouse name: Not on file    Number of children: Not on file    Years of education: Not on file    Highest education level: Not on file   Occupational History    Not on file   Social Needs    Financial resource strain: Somewhat hard    Food insecurity:     Worry: Sometimes true     Inability: Sometimes true    Transportation needs:     Medical: No     Non-medical: No    Tobacco Use    Smoking status: Never Smoker    Smokeless tobacco: Never Used   Substance and Sexual Activity    Alcohol use: No     Alcohol/week: 0.0 standard drinks     Frequency: Never     Binge frequency: Never    Drug use: No    Sexual activity: Not on file   Lifestyle    Physical activity:     Days per week: 1 day     Minutes per session: 20 min    Stress: To some extent   Relationships    Social connections:     Talks on phone: More than three times a week     Gets together: Three times a week     Attends Anabaptism service: Not on file     Active member of club or organization: Yes     Attends meetings of clubs or organizations: More than 4 times per year     Relationship status:    Other Topics Concern    Not on file   Social History Narrative    Not on file       Current Outpatient Medications   Medication Sig Dispense Refill    atorvastatin (LIPITOR) 40 MG tablet TAKE 1 TABLET BY MOUTH once DAILY 90 tablet 3    C,E,zinc,copper 11-omega3s-lut (OCUVITE ADULT 50 PLUS) 250-5-1 mg Cap Take by mouth.      cholecalciferol, vitamin D3, (VITAMIN D3) 5,000 unit Tab Take 2 on MWF and 1 the other days      citalopram (CELEXA) 20 MG tablet Take 1 tablet (20 mg total) by mouth once daily. 90 tablet 3    clopidogrel (PLAVIX) 75 mg tablet TAKE 1 TABLET BY MOUTH once DAILY 90 tablet 3    coQ10, ubiquinol, 100 mg Cap Take 1 capsule by mouth once daily.       erythromycin (ROMYCIN) ophthalmic ointment Place a 1/2 inch ribbon of ointment into the lower eyelid 4 times daily. 1 Tube 0    glimepiride (AMARYL) 4 MG tablet Take 1 tablet (4 mg total) by mouth daily with breakfast. 90 tablet 1    insulin (LANTUS SOLOSTAR U-100 INSULIN) glargine 100 units/mL (3mL) SubQ pen Inject 20 Units into the skin every evening. 15 mL 5    iron fum,ps-FA-vit B,C#18-Lact 130 mg iron -1,250 mcg Cap Take 1 tablet by mouth every other day.      lansoprazole (PREVACID) 30 MG capsule Take 1 capsule (30 mg total) by mouth  "once daily. 90 capsule 3    METANX, ALGAL OIL, 3 mg-35 mg-2 mg -90.314 mg Cap Take 1 capsule by mouth 2 (two) times daily. 180 capsule 1    pen needle, diabetic (SURE COMFORT PEN NEEDLE) 32 gauge x 5/32" Ndle Sure Comfort Pen Needle 32 gauge x 5/32"   use as directed with lantus for a 30 day supply      SITagliptin (JANUVIA) 100 MG Tab Take 1 tablet (100 mg total) by mouth once daily. 90 tablet 1    aspirin (ECOTRIN) 81 MG EC tablet Take 1 tablet (81 mg total) by mouth once daily.  0    blood sugar diagnostic (FREESTYLE LITE STRIPS) Strp use to teSt TWICE DAILY (Patient not taking: Reported on 12/9/2019) 200 strip 3    blood-glucose meter (FREESTYLE LITE METER) kit Use as instructed (Patient not taking: Reported on 12/9/2019) 1 each 0    chrom edgar/brindal berry (GARCINIA CAMBOGIA ORAL) Take 250 mg by mouth 3 (three) times daily before meals.      pen needle, diabetic (SURE COMFORT PEN NEEDLE) 32 gauge x 5/32" Ndle use as directed with lantus 200 each 6     No current facility-administered medications for this visit.        Review of patient's allergies indicates:   Allergen Reactions    Bacitracin Dermatitis     Tested positive for contact allergy to bacitracin (should also avoid neomycin, and polymixin if possible)--marino Obrien, 2015    Balsam peru Rash     Tested positive for contact allergy to Balsam of peru,used in flavorings and fragrances--had very pruritic, extensive rash but not clear which products provoked it.  marino Obrien, 2015    Epoxy resin Rash     Tested positive for contact allergy to epoxy resin--also to Carba mix, involved in rubber mfg--but couldn't pull that up     Ethylenediamine (bulk) Rash     Tested positive for contact allergy to ethylenediamine dihydrochloride, Marino Obrien, 2015--after prolonged pruritic rash---often present in fungicides, antibiotic creams, eye and nose drops.    Gold sodium thiosulfate Rash     Tested positive for contact allergy by Dr" Slim, summer, 2015--after having severe pruritic rashes    Latex, natural rubber Rash    Metformin Diarrhea     Really affected things    Sulfa (sulfonamide antibiotics) Other (See Comments)     Affects liver    Tixocortol pivalate Rash     --tested positive for contact allergy to tixocortol-21-pivalate, commonly found in topical anti-inflammatory products and related corticosteroids    Chromium and derivatives Rash    Gold salts     Losartan Rash         Review of Systems   Constitution: Negative for chills and fever.   Cardiovascular: Positive for leg swelling. Negative for claudication.   Skin: Positive for color change and nail changes.   Musculoskeletal: Positive for joint swelling. Negative for joint pain, muscle cramps, muscle weakness and myalgias.   Neurological: Positive for numbness. Negative for paresthesias.   Psychiatric/Behavioral: Negative for altered mental status.           Objective:      Physical Exam   Constitutional: She is oriented to person, place, and time. She appears well-developed and well-nourished. No distress.   Cardiovascular:   Pulses:       Dorsalis pedis pulses are 2+ on the right side, and 2+ on the left side.        Posterior tibial pulses are 2+ on the right side, and 2+ on the left side.   CFT is < 3 seconds bilateral.  Pedal hair growth is decreased bilateral.  Varicosities noted bilateral. Moderate non pitting edema noted to bilateral lower extremity.  Toes are cool to touch bilateral.     Musculoskeletal: She exhibits edema. She exhibits no tenderness.   Muscle strength 5/5 in all muscle groups bilateral.  No tenderness nor crepitation with ROM of foot/ankle joints bilateral.  No tenderness with palpation of bilateral foot and ankle.  Bilateral pes planus foot type.  Rectus alignment of all toes bilateral.     Neurological: She is alert and oriented to person, place, and time. She has normal strength. A sensory deficit is present.   Protective sensation per  [place]Justyna monofilament is decreased bilateral.  Vibratory sensation is decreased bilateral.  Light touch is intact bilateral.   Skin: Skin is warm, dry and intact. Capillary refill takes 2 to 3 seconds. No abrasion, no bruising, no burn, no ecchymosis, no lesion and no rash noted. She is not diaphoretic. No erythema. No pallor.   Pedal skin appears edematous bilateral.  Toenails x 10 appear thickened by 2 mm's, elongated by 4 mm's, and discolored with subungual debris. Examination of the skin reveals no evidence of significant maceration, rashes, open lesions, suspicious appearing nevi or other concerning lesions.              Assessment:       Encounter Diagnoses   Name Primary?    Venous insufficiency     Peripheral edema     Diabetic polyneuropathy associated with type 2 diabetes mellitus Yes    Onychomycosis due to dermatophyte     Morbid obesity with BMI of 45.0-49.9, adult          Plan:       Alana was seen today for diabetes mellitus and diabetic foot exam.    Diagnoses and all orders for this visit:    Diabetic polyneuropathy associated with type 2 diabetes mellitus    Venous insufficiency    Peripheral edema    Onychomycosis due to dermatophyte    Morbid obesity with BMI of 45.0-49.9, adult      I counseled the patient on her conditions, their implications and medical management.    Patient to fill a recent Rx from Cardiology for compression stockings (20-30 mmHg) to better manage venous insufficiency.    Discussed the importance of diet and exercise as they pertain to diabetes management and maintaining a healthy BMI.    Shoe inspection. Diabetic Foot Education. Patient reminded of the importance of good nutrition and blood sugar control to help prevent podiatric complications of diabetes. Patient instructed on proper foot hygeine. We discussed wearing proper shoe gear, daily foot inspections, never walking without protective shoe gear, never putting sharp instruments to feet    With  patient's permission, nails were aggressively reduced and debrided x 10 to their soft tissue attachment mechanically and with electric , removing all offending nail and debris. Patient relates relief following the procedure. She will continue to monitor the areas daily, inspect her feet, wear protective shoe gear when ambulatory, moisturizer to maintain skin integrity and follow in this office in approximately 2-3 months, sooner p.r.n.    Follow up in about 3 months (around 3/9/2020).    Sandoval Pierre DPM

## 2020-01-10 PROBLEM — S52.571A: Status: RESOLVED | Noted: 2018-01-05 | Resolved: 2020-01-10

## 2020-01-10 PROBLEM — S42.221A CLOSED 2-PART DISPLACED FRACTURE OF SURGICAL NECK OF RIGHT HUMERUS: Status: RESOLVED | Noted: 2018-01-05 | Resolved: 2020-01-10

## 2020-03-11 ENCOUNTER — OFFICE VISIT (OUTPATIENT)
Dept: PODIATRY | Facility: CLINIC | Age: 70
End: 2020-03-11
Payer: COMMERCIAL

## 2020-03-11 VITALS — WEIGHT: 280 LBS | HEIGHT: 65 IN | BODY MASS INDEX: 46.65 KG/M2

## 2020-03-11 DIAGNOSIS — E66.01 MORBID OBESITY WITH BMI OF 45.0-49.9, ADULT: ICD-10-CM

## 2020-03-11 DIAGNOSIS — R60.0 PERIPHERAL EDEMA: ICD-10-CM

## 2020-03-11 DIAGNOSIS — I87.2 VENOUS INSUFFICIENCY: ICD-10-CM

## 2020-03-11 DIAGNOSIS — B35.1 ONYCHOMYCOSIS DUE TO DERMATOPHYTE: ICD-10-CM

## 2020-03-11 DIAGNOSIS — E11.42 DIABETIC POLYNEUROPATHY ASSOCIATED WITH TYPE 2 DIABETES MELLITUS: Primary | ICD-10-CM

## 2020-03-11 PROCEDURE — 11721 DEBRIDE NAIL 6 OR MORE: CPT | Mod: Q9,S$GLB,, | Performed by: PODIATRIST

## 2020-03-11 PROCEDURE — 11721 PR DEBRIDEMENT OF NAILS, 6 OR MORE: ICD-10-PCS | Mod: Q9,S$GLB,, | Performed by: PODIATRIST

## 2020-03-11 PROCEDURE — 99213 PR OFFICE/OUTPT VISIT, EST, LEVL III, 20-29 MIN: ICD-10-PCS | Mod: 25,S$GLB,, | Performed by: PODIATRIST

## 2020-03-11 PROCEDURE — 99213 OFFICE O/P EST LOW 20 MIN: CPT | Mod: 25,S$GLB,, | Performed by: PODIATRIST

## 2020-03-11 PROCEDURE — 99999 PR PBB SHADOW E&M-EST. PATIENT-LVL III: CPT | Mod: PBBFAC,,, | Performed by: PODIATRIST

## 2020-03-11 PROCEDURE — 99999 PR PBB SHADOW E&M-EST. PATIENT-LVL III: ICD-10-PCS | Mod: PBBFAC,,, | Performed by: PODIATRIST

## 2020-03-11 NOTE — PROGRESS NOTES
Subjective:      Patient ID: Alana Snow is a 69 y.o. female.    Chief Complaint: Nail Care (Dr Yun, 8.5 1/2020 )    Alana is a 69 y.o. female who presents to the clinic for evaluation and treatment of diabetic feet. Alana has a past medical history of AVM (arteriovenous malformation) of small bowel, acquired, Cataract, Colon polyp (01/11/2016), Coronary artery disease, Diabetes mellitus, type 2, DJD (degenerative joint disease) of knee, DJD (degenerative joint disease) of knee (3/31/2016), Drop foot gait, Hypertension, Internal hemorrhoid (01/11/2016), and Plantar fasciitis. Patient relates having toenails that are in need of trimming.  Denies being painful with wearing shoe gear.  Has not attempted to self trim.  Relates attempting to use medical grade compression stockings, however, states this has resulted in more pain in the lower extremities.  She has since discontinued use and is wearing compression stockings that she is able to tolerate better.  Lastly, relates attempting to better control her blood glucose.  Denies any additional pedal complaints.       PCP: Wandy Yun MD    Date Last Seen by PCP: 1/20    Hemoglobin A1C   Date Value Ref Range Status   01/25/2020 8.5 (H) 0.0 - 5.6 % Final     Comment:     Reference Interval:  5.0 - 5.6 Normal   5.7 - 6.4 High Risk   > 6.5 Diabetic    Hgb A1c results are standardized based on the (NGSP) National   Glycohemoglobin Standardization Program.    Hemoglobin A1C levels are related to mean serum/plasma glucose   during the preceding 2-3 months.        06/29/2019 7.9 (H) 0.0 - 5.6 % Final     Comment:     Reference Interval:  5.0 - 5.6 Normal   5.7 - 6.4 High Risk   > 6.5 Diabetic    Hgb A1c results are standardized based on the (NGSP) National   Glycohemoglobin Standardization Program.    Hemoglobin A1C levels are related to mean serum/plasma glucose   during the preceding 2-3 months.        02/01/2019 7.1 (H) 0.0 - 5.6 % Final     Comment:      Reference Interval:  5.0 - 5.6 Normal   5.7 - 6.4 High Risk   > 6.5 Diabetic    Hgb A1c results are standardized based on the (NGSP) National   Glycohemoglobin Standardization Program.    Hemoglobin A1C levels are related to mean serum/plasma glucose   during the preceding 2-3 months.                Past Medical History:   Diagnosis Date    AVM (arteriovenous malformation) of small bowel, acquired     Cataract     Colon polyp 01/11/2016    Coronary artery disease     Diabetes mellitus, type 2     DJD (degenerative joint disease) of knee     left    DJD (degenerative joint disease) of knee 3/31/2016    Drop foot gait     right    Hypertension     Internal hemorrhoid 01/11/2016    Plantar fasciitis        Past Surgical History:   Procedure Laterality Date    ABDOMINAL SURGERY      Cholecystectomy    BREAST BIOPSY Right     excisional over 10 yrs. benign    BREAST CYST ASPIRATION      CARPAL TUNNEL RELEASE Left 6/26/10    per Dr. Ramirez    CARPAL TUNNEL RELEASE Right 8/14/10    per Dr. Ramirez    COLON SURGERY      sigmoidectomy    COLONOSCOPY N/A 1/11/2016    Procedure: COLONOSCOPY;  Surgeon: Blair López MD;  Location: [facility];  Service: Endoscopy;  Laterality: N/A;    CORONARY STENT PLACEMENT      PILONIDAL CYST DRAINAGE      WRIST FRACTURE SURGERY Right 01/10/2018    clsd reduction w/application ex-fix       Family History   Problem Relation Age of Onset    Diabetes Mother     Cancer Father     Coronary artery disease Father     Breast cancer Paternal Grandmother         age unknown    Breast cancer Paternal Aunt         age unknown       Social History     Socioeconomic History    Marital status:      Spouse name: Not on file    Number of children: Not on file    Years of education: Not on file    Highest education level: Not on file   Occupational History    Not on file   Social Needs    Financial resource strain: Somewhat hard    Food insecurity:     Worry: Sometimes  true     Inability: Sometimes true    Transportation needs:     Medical: No     Non-medical: No   Tobacco Use    Smoking status: Never Smoker    Smokeless tobacco: Never Used   Substance and Sexual Activity    Alcohol use: No     Alcohol/week: 0.0 standard drinks     Frequency: Never     Binge frequency: Never    Drug use: No    Sexual activity: Not on file   Lifestyle    Physical activity:     Days per week: 1 day     Minutes per session: 20 min    Stress: To some extent   Relationships    Social connections:     Talks on phone: More than three times a week     Gets together: Three times a week     Attends Judaism service: Not on file     Active member of club or organization: Yes     Attends meetings of clubs or organizations: More than 4 times per year     Relationship status:    Other Topics Concern    Not on file   Social History Narrative    Not on file       Current Outpatient Medications   Medication Sig Dispense Refill    atorvastatin (LIPITOR) 40 MG tablet TAKE 1 TABLET BY MOUTH once DAILY 90 tablet 3    azithromycin (Z-DEBBIE) 250 MG tablet Use as directed. 6 tablet 0    blood sugar diagnostic (FREESTYLE LITE STRIPS) Strp use to teSt TWICE DAILY 200 strip 3    blood-glucose meter (FREESTYLE LITE METER) kit Use as instructed 1 each 0    C,E,zinc,copper 11-omega3s-lut (OCUVITE ADULT 50 PLUS) 250-5-1 mg Cap Take by mouth.      cholecalciferol, vitamin D3, (VITAMIN D3) 5,000 unit Tab Take 2 on MWF and 1 the other days      citalopram (CELEXA) 20 MG tablet Take 1 tablet (20 mg total) by mouth once daily. 90 tablet 3    clopidogrel (PLAVIX) 75 mg tablet TAKE 1 TABLET BY MOUTH once DAILY 90 tablet 3    coQ10, ubiquinol, 100 mg Cap Take 1 capsule by mouth once daily.       FUSION PLUS 130 mg iron -1,250 mcg Cap TAKE 1 CAPSULE BY MOUTH DAILY to replace iron 90 capsule 4    glimepiride (AMARYL) 4 MG tablet Take 1 tablet (4 mg total) by mouth daily with breakfast. 90 tablet 1    insulin  (LANTUS SOLOSTAR U-100 INSULIN) glargine 100 units/mL (3mL) SubQ pen Inject 20 Units into the skin every evening. 15 mL 5    iron fum,ps-FA-vit B,C#18-Lact 130 mg iron -1,250 mcg Cap Take 1 tablet by mouth every other day.      lansoprazole (PREVACID) 30 MG capsule Take 1 capsule (30 mg total) by mouth once daily. 90 capsule 3    METANX, ALGAL OIL, 3 mg-35 mg-2 mg -90.314 mg Cap Take 1 capsule by mouth 2 (two) times daily. 180 capsule 1    SITagliptin (JANUVIA) 100 MG Tab Take 1 tablet (100 mg total) by mouth once daily. 90 tablet 1     No current facility-administered medications for this visit.        Review of patient's allergies indicates:   Allergen Reactions    Bacitracin Dermatitis     Tested positive for contact allergy to bacitracin (should also avoid neomycin, and polymixin if possible)--marino Obrien, 2015    Balsam peru Rash     Tested positive for contact allergy to Balsam of peru,used in flavorings and fragrances--had very pruritic, extensive rash but not clear which products provoked it.  marino Obrien, 2015    Epoxy resin Rash     Tested positive for contact allergy to epoxy resin--also to Carba mix, involved in rubber mfg--but couldn't pull that up     Ethylenediamine (bulk) Rash     Tested positive for contact allergy to ethylenediamine dihydrochloride, Marion Obrien, 2015--after prolonged pruritic rash---often present in fungicides, antibiotic creams, eye and nose drops.    Gold sodium thiosulfate Rash     Tested positive for contact allergy by marino Obrien, 2015--after having severe pruritic rashes    Latex, natural rubber Rash    Metformin Diarrhea     Really affected things    Sulfa (sulfonamide antibiotics) Other (See Comments)     Affects liver    Tixocortol pivalate Rash     --tested positive for contact allergy to tixocortol-21-pivalate, commonly found in topical anti-inflammatory products and related corticosteroids    Chromium and derivatives Rash     Gold salts     Losartan Rash         Review of Systems   Constitution: Negative for chills and fever.   Cardiovascular: Positive for leg swelling. Negative for claudication.   Skin: Positive for color change and nail changes.   Musculoskeletal: Positive for joint swelling. Negative for joint pain, muscle cramps, muscle weakness and myalgias.   Neurological: Positive for numbness. Negative for paresthesias.   Psychiatric/Behavioral: Negative for altered mental status.           Objective:      Physical Exam   Constitutional: She is oriented to person, place, and time. She appears well-developed and well-nourished. No distress.   Cardiovascular:   Pulses:       Dorsalis pedis pulses are 2+ on the right side, and 2+ on the left side.        Posterior tibial pulses are 2+ on the right side, and 2+ on the left side.   CFT is < 3 seconds bilateral.  Pedal hair growth is decreased bilateral.  Varicosities noted bilateral.  1+ pitting edema noted to bilateral lower extremity.  Toes are cool to touch bilateral.     Musculoskeletal: She exhibits edema. She exhibits no tenderness.   Muscle strength 5/5 in all muscle groups bilateral.  No tenderness nor crepitation with ROM of foot/ankle joints bilateral.  No tenderness with palpation of bilateral foot and ankle.  Bilateral pes planus foot type.  Rectus alignment of all toes bilateral.     Neurological: She is alert and oriented to person, place, and time. She has normal strength. A sensory deficit is present.   Protective sensation per [place] monofilament is decreased bilateral.  Vibratory sensation is decreased bilateral.  Light touch is intact bilateral.   Skin: Skin is warm, dry and intact. Capillary refill takes 2 to 3 seconds. No abrasion, no bruising, no burn, no ecchymosis, no lesion and no rash noted. She is not diaphoretic. No erythema. No pallor.   Pedal skin appears edematous bilateral.  Toenails x 10 appear thickened by 2 mm's, elongated by 5 mm's, and  discolored with subungual debris. Examination of the skin reveals no evidence of significant maceration, rashes, open lesions, suspicious appearing nevi or other concerning lesions.              Assessment:       Encounter Diagnoses   Name Primary?    Diabetic polyneuropathy associated with type 2 diabetes mellitus Yes    Venous insufficiency     Peripheral edema     Morbid obesity with BMI of 45.0-49.9, adult     Onychomycosis due to dermatophyte          Plan:       Alana was seen today for nail care.    Diagnoses and all orders for this visit:    Diabetic polyneuropathy associated with type 2 diabetes mellitus    Venous insufficiency    Peripheral edema    Morbid obesity with BMI of 45.0-49.9, adult    Onychomycosis due to dermatophyte      I counseled the patient on her conditions, their implications and medical management.    Patient to discuss with Cardiology the possibility of EVLT to address bilateral venous insufficiency.  To continue elevating the limbs in the meantime.    Discussed the importance of diet and exercise as they pertain to diabetes management and maintaining a healthy BMI.    Shoe inspection. Diabetic Foot Education. Patient reminded of the importance of good nutrition and blood sugar control to help prevent podiatric complications of diabetes. Patient instructed on proper foot hygeine. We discussed wearing proper shoe gear, daily foot inspections, never walking without protective shoe gear, never putting sharp instruments to feet    With patient's permission, nails were aggressively reduced and debrided x 10 to their soft tissue attachment mechanically and with electric , removing all offending nail and debris. Patient relates relief following the procedure. She will continue to monitor the areas daily, inspect her feet, wear protective shoe gear when ambulatory, moisturizer to maintain skin integrity and follow in this office in approximately 2-3 months, sooner  p.r.n.    Follow up in about 3 months (around 6/11/2020).    Sandoval Pierre DPM

## 2020-04-15 PROBLEM — I87.2 VENOUS REFLUX: Status: ACTIVE | Noted: 2020-04-15

## 2020-04-15 PROBLEM — Z79.4 TYPE 2 DIABETES MELLITUS WITH DIABETIC POLYNEUROPATHY, WITH LONG-TERM CURRENT USE OF INSULIN: Status: ACTIVE | Noted: 2020-04-15

## 2020-04-15 PROBLEM — E11.42 TYPE 2 DIABETES MELLITUS WITH DIABETIC POLYNEUROPATHY, WITH LONG-TERM CURRENT USE OF INSULIN: Status: ACTIVE | Noted: 2020-04-15

## 2020-06-29 ENCOUNTER — OFFICE VISIT (OUTPATIENT)
Dept: PODIATRY | Facility: CLINIC | Age: 70
End: 2020-06-29
Payer: COMMERCIAL

## 2020-06-29 VITALS — HEIGHT: 65 IN | WEIGHT: 281.94 LBS | BODY MASS INDEX: 46.98 KG/M2

## 2020-06-29 DIAGNOSIS — R60.0 PERIPHERAL EDEMA: ICD-10-CM

## 2020-06-29 DIAGNOSIS — E11.42 DIABETIC POLYNEUROPATHY ASSOCIATED WITH TYPE 2 DIABETES MELLITUS: Primary | ICD-10-CM

## 2020-06-29 DIAGNOSIS — I87.2 VENOUS INSUFFICIENCY: ICD-10-CM

## 2020-06-29 DIAGNOSIS — E66.01 MORBID OBESITY WITH BMI OF 45.0-49.9, ADULT: ICD-10-CM

## 2020-06-29 DIAGNOSIS — B35.1 ONYCHOMYCOSIS DUE TO DERMATOPHYTE: ICD-10-CM

## 2020-06-29 PROCEDURE — 99499 NO LOS: ICD-10-PCS | Mod: S$GLB,,, | Performed by: PODIATRIST

## 2020-06-29 PROCEDURE — 99999 PR PBB SHADOW E&M-EST. PATIENT-LVL III: CPT | Mod: PBBFAC,,, | Performed by: PODIATRIST

## 2020-06-29 PROCEDURE — 11721 DEBRIDE NAIL 6 OR MORE: CPT | Mod: Q9,S$GLB,, | Performed by: PODIATRIST

## 2020-06-29 PROCEDURE — 11721 PR DEBRIDEMENT OF NAILS, 6 OR MORE: ICD-10-PCS | Mod: Q9,S$GLB,, | Performed by: PODIATRIST

## 2020-06-29 PROCEDURE — 99499 UNLISTED E&M SERVICE: CPT | Mod: S$GLB,,, | Performed by: PODIATRIST

## 2020-06-29 PROCEDURE — 99999 PR PBB SHADOW E&M-EST. PATIENT-LVL III: ICD-10-PCS | Mod: PBBFAC,,, | Performed by: PODIATRIST

## 2020-06-30 ENCOUNTER — TELEPHONE (OUTPATIENT)
Dept: PODIATRY | Facility: CLINIC | Age: 70
End: 2020-06-30

## 2020-06-30 NOTE — PROGRESS NOTES
Subjective:      Patient ID: Alana Snow is a 70 y.o. female.    Chief Complaint: Diabetes Mellitus (Yun 2/10/20) and Diabetic Foot Exam    Alana is a 70 y.o. female who presents to the clinic for evaluation and treatment of diabetic feet. Alana has a past medical history of AVM (arteriovenous malformation) of small bowel, acquired, Cataract, Colon polyp (01/11/2016), Coronary artery disease, Diabetes mellitus, type 2, DJD (degenerative joint disease) of knee, DJD (degenerative joint disease) of knee (3/31/2016), Drop foot gait, Hypertension, Internal hemorrhoid (01/11/2016), and Plantar fasciitis. Patient relates having toenails that are in need of trimming.  Denies being painful with wearing shoe gear.  Has not attempted to self trim.  Notes an increase in her blood glucose since the pandemic.  Is attempting to better control this issue with diet.  Denies overt neuropathy pain with today's exam.  Denies any additional pedal complaints.       PCP: Wandy Yun MD    Date Last Seen by PCP: 2/20    Hemoglobin A1C   Date Value Ref Range Status   01/25/2020 8.5 (H) 0.0 - 5.6 % Final     Comment:     Reference Interval:  5.0 - 5.6 Normal   5.7 - 6.4 High Risk   > 6.5 Diabetic    Hgb A1c results are standardized based on the (NGSP) National   Glycohemoglobin Standardization Program.    Hemoglobin A1C levels are related to mean serum/plasma glucose   during the preceding 2-3 months.        06/29/2019 7.9 (H) 0.0 - 5.6 % Final     Comment:     Reference Interval:  5.0 - 5.6 Normal   5.7 - 6.4 High Risk   > 6.5 Diabetic    Hgb A1c results are standardized based on the (NGSP) National   Glycohemoglobin Standardization Program.    Hemoglobin A1C levels are related to mean serum/plasma glucose   during the preceding 2-3 months.        02/01/2019 7.1 (H) 0.0 - 5.6 % Final     Comment:     Reference Interval:  5.0 - 5.6 Normal   5.7 - 6.4 High Risk   > 6.5 Diabetic    Hgb A1c results are standardized based on the  (NGSP) National   Glycohemoglobin Standardization Program.    Hemoglobin A1C levels are related to mean serum/plasma glucose   during the preceding 2-3 months.                Past Medical History:   Diagnosis Date    AVM (arteriovenous malformation) of small bowel, acquired     Cataract     Colon polyp 01/11/2016    Coronary artery disease     Diabetes mellitus, type 2     DJD (degenerative joint disease) of knee     left    DJD (degenerative joint disease) of knee 3/31/2016    Drop foot gait     right    Hypertension     Internal hemorrhoid 01/11/2016    Plantar fasciitis        Past Surgical History:   Procedure Laterality Date    ABDOMINAL SURGERY      Cholecystectomy    BREAST BIOPSY Right     excisional over 10 yrs. benign    BREAST CYST ASPIRATION      CARPAL TUNNEL RELEASE Left 6/26/10    per Dr. Ramirez    CARPAL TUNNEL RELEASE Right 8/14/10    per Dr. Ramirez    COLON SURGERY      sigmoidectomy    COLONOSCOPY N/A 1/11/2016    Procedure: COLONOSCOPY;  Surgeon: Blair López MD;  Location: [facility];  Service: Endoscopy;  Laterality: N/A;    CORONARY STENT PLACEMENT      PILONIDAL CYST DRAINAGE      WRIST FRACTURE SURGERY Right 01/10/2018    clsd reduction w/application ex-fix       Family History   Problem Relation Age of Onset    Diabetes Mother     Cancer Father     Coronary artery disease Father     Breast cancer Paternal Grandmother         age unknown    Breast cancer Paternal Aunt         age unknown       Social History     Socioeconomic History    Marital status:      Spouse name: Not on file    Number of children: Not on file    Years of education: Not on file    Highest education level: Not on file   Occupational History    Not on file   Social Needs    Financial resource strain: Somewhat hard    Food insecurity     Worry: Sometimes true     Inability: Sometimes true    Transportation needs     Medical: No     Non-medical: No   Tobacco Use    Smoking  status: Never Smoker    Smokeless tobacco: Never Used   Substance and Sexual Activity    Alcohol use: No     Alcohol/week: 0.0 standard drinks     Frequency: Never     Binge frequency: Never    Drug use: No    Sexual activity: Not on file   Lifestyle    Physical activity     Days per week: 1 day     Minutes per session: 20 min    Stress: To some extent   Relationships    Social connections     Talks on phone: More than three times a week     Gets together: Three times a week     Attends Taoist service: Not on file     Active member of club or organization: Yes     Attends meetings of clubs or organizations: More than 4 times per year     Relationship status:    Other Topics Concern    Not on file   Social History Narrative    Not on file       Current Outpatient Medications   Medication Sig Dispense Refill    atorvastatin (LIPITOR) 40 MG tablet TAKE 1 TABLET BY MOUTH once DAILY 90 tablet 3    blood sugar diagnostic (FREESTYLE LITE STRIPS) Strp use to teSt TWICE DAILY 200 strip 3    blood-glucose meter (FREESTYLE LITE METER) kit Use as instructed 1 each 0    C,E,zinc,copper 11-omega3s-lut (OCUVITE ADULT 50 PLUS) 250-5-1 mg Cap Take by mouth.      cholecalciferol, vitamin D3, (VITAMIN D3) 5,000 unit Tab Take 2 on MWF and 1 the other days      citalopram (CELEXA) 20 MG tablet Take 1 tablet (20 mg total) by mouth once daily. 90 tablet 3    clopidogrel (PLAVIX) 75 mg tablet TAKE 1 TABLET BY MOUTH once DAILY 90 tablet 3    coQ10, ubiquinol, 100 mg Cap Take 1 capsule by mouth once daily.       FUSION PLUS 130 mg iron -1,250 mcg Cap TAKE 1 CAPSULE BY MOUTH DAILY to replace iron 90 capsule 4    glimepiride (AMARYL) 4 MG tablet Take 1 tablet (4 mg total) by mouth daily with breakfast. 90 tablet 1    insulin (LANTUS SOLOSTAR U-100 INSULIN) glargine 100 units/mL (3mL) SubQ pen Inject 20 Units into the skin every evening. 15 mL 5    lansoprazole (PREVACID) 30 MG capsule Take 1 capsule (30 mg  "total) by mouth once daily. 90 capsule 3    METANX, ALGAL OIL, 3 mg-35 mg-2 mg -90.314 mg Cap Take 1 capsule by mouth 2 (two) times daily. 180 capsule 3    SITagliptin (JANUVIA) 100 MG Tab Take 1 tablet (100 mg total) by mouth once daily. 90 tablet 1    SURE COMFORT PEN NEEDLE 32 gauge x 5/32" Ndle use as directed with lantus       No current facility-administered medications for this visit.        Review of patient's allergies indicates:   Allergen Reactions    Bacitracin Dermatitis     Tested positive for contact allergy to bacitracin (should also avoid neomycin, and polymixin if possible)--marino Obrien, 2015    Balsam peru Rash     Tested positive for contact allergy to Balsam of peru,used in flavorings and fragrances--had very pruritic, extensive rash but not clear which products provoked it.  marino Obrien, 2015    Epoxy resin Rash     Tested positive for contact allergy to epoxy resin--also to Carba mix, involved in rubber mfg--but couldn't pull that up     Ethylenediamine (bulk) Rash     Tested positive for contact allergy to ethylenediamine dihydrochloride, Marino Obrien, 2015--after prolonged pruritic rash---often present in fungicides, antibiotic creams, eye and nose drops.    Gold sodium thiosulfate Rash     Tested positive for contact allergy by marino Obrien, 2015--after having severe pruritic rashes    Latex, natural rubber Rash    Metformin Diarrhea     Really affected things    Sulfa (sulfonamide antibiotics) Other (See Comments)     Affects liver    Tixocortol pivalate Rash     --tested positive for contact allergy to tixocortol-21-pivalate, commonly found in topical anti-inflammatory products and related corticosteroids    Chromium and derivatives Rash    Gold salts     Losartan Rash         Review of Systems   Constitution: Negative for chills and fever.   Cardiovascular: Positive for leg swelling. Negative for claudication.   Skin: Positive for color change and " nail changes.   Musculoskeletal: Positive for joint swelling. Negative for joint pain, muscle cramps, muscle weakness and myalgias.   Neurological: Positive for numbness. Negative for paresthesias.   Psychiatric/Behavioral: Negative for altered mental status.           Objective:      Physical Exam  Constitutional:       General: She is not in acute distress.     Appearance: She is well-developed. She is not diaphoretic.   Cardiovascular:      Pulses:           Dorsalis pedis pulses are 2+ on the right side and 2+ on the left side.        Posterior tibial pulses are 2+ on the right side and 2+ on the left side.      Comments: CFT is < 3 seconds bilateral.  Pedal hair growth is decreased bilateral.  Varicosities noted bilateral.  Moderate non pitting edema noted to bilateral lower extremity.  Toes are cool to touch bilateral.    Musculoskeletal:         General: No tenderness.      Comments: Muscle strength 5/5 in all muscle groups bilateral.  No tenderness nor crepitation with ROM of foot/ankle joints bilateral.  No tenderness with palpation of bilateral foot and ankle.  Bilateral pes planus foot type.  Rectus alignment of all toes bilateral.     Skin:     General: Skin is warm and dry.      Capillary Refill: Capillary refill takes 2 to 3 seconds.      Coloration: Skin is not pale.      Findings: No abrasion, bruising, burn, ecchymosis, erythema, lesion or rash.      Comments: Pedal skin appears edematous bilateral.  Toenails x 10 appear thickened by 2 mm's, elongated by 6 mm's, and discolored with subungual debris. Examination of the skin reveals no evidence of significant maceration, rashes, open lesions, suspicious appearing nevi or other concerning lesions.    Neurological:      Mental Status: She is alert and oriented to person, place, and time.      Sensory: Sensory deficit present.      Motor: Motor function is intact. No weakness or atrophy.      Comments: Protective sensation per [place]  monofilament is decreased bilateral.  Vibratory sensation is decreased bilateral.  Light touch is intact bilateral.               Assessment:       Encounter Diagnoses   Name Primary?    Diabetic polyneuropathy associated with type 2 diabetes mellitus Yes    Venous insufficiency     Peripheral edema     Onychomycosis due to dermatophyte     Morbid obesity with BMI of 45.0-49.9, adult          Plan:       Alana was seen today for diabetes mellitus and diabetic foot exam.    Diagnoses and all orders for this visit:    Diabetic polyneuropathy associated with type 2 diabetes mellitus    Venous insufficiency    Peripheral edema    Onychomycosis due to dermatophyte    Morbid obesity with BMI of 45.0-49.9, adult      I counseled the patient on her conditions, their implications and medical management.    Patient to continue elevation of the lower extremities on days with pronounced peripheral edema.    Discussed the importance of diet and exercise as they pertain to diabetes management and maintaining a healthy BMI.    Shoe inspection. Diabetic Foot Education. Patient reminded of the importance of good nutrition and blood sugar control to help prevent podiatric complications of diabetes. Patient instructed on proper foot hygeine. We discussed wearing proper shoe gear, daily foot inspections, never walking without protective shoe gear, never putting sharp instruments to feet    With patient's permission, nails were aggressively reduced and debrided x 10 to their soft tissue attachment mechanically and with electric , removing all offending nail and debris. Patient relates relief following the procedure. She will continue to monitor the areas daily, inspect her feet, wear protective shoe gear when ambulatory, moisturizer to maintain skin integrity and follow in this office in approximately 2-3 months, sooner p.r.n.    Follow up in about 3 months (around 9/29/2020).    Sandoval Pierre DPM

## 2020-06-30 NOTE — TELEPHONE ENCOUNTER
----- Message from Tori Juarez sent at 6/30/2020  8:25 AM CDT -----  Regarding: Need medical advice  Contact: pt  Type: Need medical advice      Who Called:  Pt  Best Call Back Number:  [phone]  Additional Information:   Pt would like a copy of the summary of her visit with Dr. Pierre on yesterday, Please advise         
AVS faxed to her at [phone] per patient request.  
Additional Progress Note...

## 2020-10-21 ENCOUNTER — OFFICE VISIT (OUTPATIENT)
Dept: PODIATRY | Facility: CLINIC | Age: 70
End: 2020-10-21
Payer: COMMERCIAL

## 2020-10-21 DIAGNOSIS — R60.0 PERIPHERAL EDEMA: ICD-10-CM

## 2020-10-21 DIAGNOSIS — B35.1 ONYCHOMYCOSIS DUE TO DERMATOPHYTE: ICD-10-CM

## 2020-10-21 DIAGNOSIS — E66.01 MORBID OBESITY WITH BMI OF 45.0-49.9, ADULT: ICD-10-CM

## 2020-10-21 DIAGNOSIS — E11.42 DIABETIC POLYNEUROPATHY ASSOCIATED WITH TYPE 2 DIABETES MELLITUS: Primary | ICD-10-CM

## 2020-10-21 DIAGNOSIS — I87.2 VENOUS INSUFFICIENCY: ICD-10-CM

## 2020-10-21 PROCEDURE — 99499 UNLISTED E&M SERVICE: CPT | Mod: S$GLB,,, | Performed by: PODIATRIST

## 2020-10-21 PROCEDURE — 99999 PR PBB SHADOW E&M-EST. PATIENT-LVL I: ICD-10-PCS | Mod: PBBFAC,,, | Performed by: PODIATRIST

## 2020-10-21 PROCEDURE — 11721 PR DEBRIDEMENT OF NAILS, 6 OR MORE: ICD-10-PCS | Mod: Q9,S$GLB,, | Performed by: PODIATRIST

## 2020-10-21 PROCEDURE — 99999 PR PBB SHADOW E&M-EST. PATIENT-LVL I: CPT | Mod: PBBFAC,,, | Performed by: PODIATRIST

## 2020-10-21 PROCEDURE — 99499 NO LOS: ICD-10-PCS | Mod: S$GLB,,, | Performed by: PODIATRIST

## 2020-10-21 PROCEDURE — 11721 DEBRIDE NAIL 6 OR MORE: CPT | Mod: Q9,S$GLB,, | Performed by: PODIATRIST

## 2020-10-22 NOTE — PROGRESS NOTES
Subjective:      Patient ID: Alana Snow is a 70 y.o. female.    Chief Complaint: No chief complaint on file.    Alana is a 70 y.o. female who presents to the clinic for evaluation and treatment of diabetic feet. Alana has a past medical history of AVM (arteriovenous malformation) of small bowel, acquired, Cataract, Colon polyp (01/11/2016), Coronary artery disease, Diabetes mellitus, type 2, DJD (degenerative joint disease) of knee, DJD (degenerative joint disease) of knee (3/31/2016), Drop foot gait, Hypertension, Internal hemorrhoid (01/11/2016), and Plantar fasciitis. Patient relates having toenails that are in need of trimming.  Denies being painful with wearing shoe gear.  Has not attempted to self trim.  Denies overt neuropathy pain with today's exam.  Denies any additional pedal complaints.       PCP: Wandy Yun MD    Date Last Seen by PCP: 7/20    Hemoglobin A1C   Date Value Ref Range Status   07/18/2020 7.6 (H) 0.0 - 5.6 % Final     Comment:     Reference Interval:  5.0 - 5.6 Normal   5.7 - 6.4 High Risk   > 6.5 Diabetic    Hgb A1c results are standardized based on the (NGSP) National   Glycohemoglobin Standardization Program.    Hemoglobin A1C levels are related to mean serum/plasma glucose   during the preceding 2-3 months.        01/25/2020 8.5 (H) 0.0 - 5.6 % Final     Comment:     Reference Interval:  5.0 - 5.6 Normal   5.7 - 6.4 High Risk   > 6.5 Diabetic    Hgb A1c results are standardized based on the (NGSP) National   Glycohemoglobin Standardization Program.    Hemoglobin A1C levels are related to mean serum/plasma glucose   during the preceding 2-3 months.        06/29/2019 7.9 (H) 0.0 - 5.6 % Final     Comment:     Reference Interval:  5.0 - 5.6 Normal   5.7 - 6.4 High Risk   > 6.5 Diabetic    Hgb A1c results are standardized based on the (NGSP) National   Glycohemoglobin Standardization Program.    Hemoglobin A1C levels are related to mean serum/plasma glucose   during the  preceding 2-3 months.                Past Medical History:   Diagnosis Date    AVM (arteriovenous malformation) of small bowel, acquired     Cataract     Colon polyp 01/11/2016    Coronary artery disease     Diabetes mellitus, type 2     DJD (degenerative joint disease) of knee     left    DJD (degenerative joint disease) of knee 3/31/2016    Drop foot gait     right    Hypertension     Internal hemorrhoid 01/11/2016    Plantar fasciitis        Past Surgical History:   Procedure Laterality Date    ABDOMINAL SURGERY      Cholecystectomy    BREAST BIOPSY Right     excisional over 10 yrs. benign    BREAST CYST ASPIRATION      CARPAL TUNNEL RELEASE Left 6/26/10    per Dr. Ramirez    CARPAL TUNNEL RELEASE Right 8/14/10    per Dr. Ramirez    COLON SURGERY      sigmoidectomy    COLONOSCOPY N/A 1/11/2016    Procedure: COLONOSCOPY;  Surgeon: Blair López MD;  Location: [facility];  Service: Endoscopy;  Laterality: N/A;    CORONARY STENT PLACEMENT      PILONIDAL CYST DRAINAGE      WRIST FRACTURE SURGERY Right 01/10/2018    clsd reduction w/application ex-fix       Family History   Problem Relation Age of Onset    Diabetes Mother     Cancer Father     Coronary artery disease Father     Breast cancer Paternal Grandmother         age unknown    Breast cancer Paternal Aunt         age unknown       Social History     Socioeconomic History    Marital status:      Spouse name: Not on file    Number of children: Not on file    Years of education: Not on file    Highest education level: Not on file   Occupational History    Not on file   Social Needs    Financial resource strain: Not hard at all    Food insecurity     Worry: Never true     Inability: Never true    Transportation needs     Medical: No     Non-medical: No   Tobacco Use    Smoking status: Never Smoker    Smokeless tobacco: Never Used   Substance and Sexual Activity    Alcohol use: No     Alcohol/week: 0.0 standard drinks      Frequency: Never     Drinks per session: Patient refused     Binge frequency: Never    Drug use: No    Sexual activity: Not on file   Lifestyle    Physical activity     Days per week: 0 days     Minutes per session: 10 min    Stress: Only a little   Relationships    Social connections     Talks on phone: More than three times a week     Gets together: Twice a week     Attends Mandaen service: Not on file     Active member of club or organization: Yes     Attends meetings of clubs or organizations: Never     Relationship status:    Other Topics Concern    Not on file   Social History Narrative    Not on file       Current Outpatient Medications   Medication Sig Dispense Refill    atorvastatin (LIPITOR) 40 MG tablet Take 1 tablet (40 mg total) by mouth once daily. 90 tablet 3    blood sugar diagnostic (FREESTYLE LITE STRIPS) Strp use to test TWICE DAILY 200 strip 3    blood-glucose meter (FREESTYLE LITE METER) kit Use as instructed 1 each 0    C,E,zinc,copper 11-omega3s-lut (OCUVITE ADULT 50 PLUS) 250-5-1 mg Cap Take 1 capsule by mouth once daily.       cholecalciferol, vitamin D3, (VITAMIN D3) 5,000 unit Tab Take 2 on MWF and 1 the other days (Patient taking differently: Take 5,000 Units by mouth once daily. )      citalopram (CELEXA) 20 MG tablet Take 1 tablet (20 mg total) by mouth once daily. 90 tablet 3    clopidogreL (PLAVIX) 75 mg tablet Take 1 tablet (75 mg total) by mouth once daily. 90 tablet 3    coQ10, ubiquinol, 100 mg Cap Take 1 capsule by mouth once daily.       FUSION PLUS 130 mg iron -1,250 mcg Cap TAKE 1 CAPSULE BY MOUTH DAILY to replace iron (Patient taking differently: Take 1 capsule by mouth every other day. ) 90 capsule 4    glimepiride (AMARYL) 4 MG tablet Take 1 tablet (4 mg total) by mouth daily with breakfast. 90 tablet 1    insulin (LANTUS SOLOSTAR U-100 INSULIN) glargine 100 units/mL (3mL) SubQ pen Inject 20 Units into the skin every evening. 15 mL 5     "lansoprazole (PREVACID) 30 MG capsule TAKE 1 CAPSULE BY MOUTH once daily 90 capsule 3    METANX, ALGAL OIL, 3 mg-35 mg-2 mg -90.314 mg Cap Take 1 capsule by mouth 2 (two) times daily. 180 capsule 3    SITagliptin (JANUVIA) 100 MG Tab Take 1 tablet (100 mg total) by mouth once daily. 90 tablet 1    SURE COMFORT PEN NEEDLE 32 gauge x 5/32" Ndle use as directed with lantus 200 each 6     No current facility-administered medications for this visit.        Review of patient's allergies indicates:   Allergen Reactions    Bacitracin Dermatitis     Tested positive for contact allergy to bacitracin (should also avoid neomycin, and polymixin if possible)--marino Obrien, 2015    Balsam peru Rash     Tested positive for contact allergy to Balsam of peru,used in flavorings and fragrances--had very pruritic, extensive rash but not clear which products provoked it.  marino Obrien, 2015    Epoxy resin Rash     Tested positive for contact allergy to epoxy resin--also to Carba mix, involved in rubber mfg--but couldn't pull that up     Ethylenediamine (bulk) Rash     Tested positive for contact allergy to ethylenediamine dihydrochloride, Marino Obrien, 2015--after prolonged pruritic rash---often present in fungicides, antibiotic creams, eye and nose drops.    Gold sodium thiosulfate Rash     Tested positive for contact allergy by marino Obrien, 2015--after having severe pruritic rashes    Latex, natural rubber Rash    Metformin Diarrhea     Really affected things    Sulfa (sulfonamide antibiotics) Other (See Comments)     Affects liver    Tixocortol pivalate Rash     --tested positive for contact allergy to tixocortol-21-pivalate, commonly found in topical anti-inflammatory products and related corticosteroids    Chromium and derivatives Rash    Gold salts     Losartan Rash         Review of Systems   Constitution: Negative for chills and fever.   Cardiovascular: Positive for leg swelling. Negative " for claudication.   Skin: Positive for color change and nail changes.   Musculoskeletal: Positive for joint swelling. Negative for joint pain, muscle cramps, muscle weakness and myalgias.   Neurological: Positive for numbness. Negative for paresthesias.   Psychiatric/Behavioral: Negative for altered mental status.           Objective:      Physical Exam  Constitutional:       General: She is not in acute distress.     Appearance: She is well-developed. She is not diaphoretic.   Cardiovascular:      Pulses:           Dorsalis pedis pulses are 2+ on the right side and 2+ on the left side.        Posterior tibial pulses are 2+ on the right side and 2+ on the left side.      Comments: CFT is < 3 seconds bilateral.  Pedal hair growth is decreased bilateral.  Varicosities noted bilateral.  Moderate non pitting edema noted to bilateral lower extremity.  Toes are cool to touch bilateral.    Musculoskeletal:         General: No tenderness.      Comments: Muscle strength 5/5 in all muscle groups bilateral.  No tenderness nor crepitation with ROM of foot/ankle joints bilateral.  No tenderness with palpation of bilateral foot and ankle.  Bilateral pes planus foot type.  Rectus alignment of all toes bilateral.     Skin:     General: Skin is warm and dry.      Capillary Refill: Capillary refill takes 2 to 3 seconds.      Coloration: Skin is not pale.      Findings: No abrasion, bruising, burn, ecchymosis, erythema, lesion or rash.      Comments: Pedal skin appears edematous bilateral.  Toenails x 10 appear thickened by 2 mm, elongated by 4 mm, and discolored with subungual debris. Examination of the skin reveals no evidence of significant maceration, rashes, open lesions, suspicious appearing nevi or other concerning lesions.    Neurological:      Mental Status: She is alert and oriented to person, place, and time.      Sensory: Sensory deficit present.      Motor: Motor function is intact. No weakness or atrophy.      Comments:  Protective sensation per [place] monofilament is decreased bilateral.  Vibratory sensation is decreased bilateral.  Light touch is intact bilateral.               Assessment:       Encounter Diagnoses   Name Primary?    Diabetic polyneuropathy associated with type 2 diabetes mellitus Yes    Morbid obesity with BMI of 45.0-49.9, adult     Venous insufficiency     Peripheral edema     Onychomycosis due to dermatophyte          Plan:       Diagnoses and all orders for this visit:    Diabetic polyneuropathy associated with type 2 diabetes mellitus    Morbid obesity with BMI of 45.0-49.9, adult    Venous insufficiency    Peripheral edema    Onychomycosis due to dermatophyte      I counseled the patient on her conditions, their implications and medical management.    Patient to continue elevation of the lower extremities, currently pursing EVLT per Cardiology.    Discussed the importance of diet and exercise as they pertain to diabetes management and maintaining a healthy BMI.    Shoe inspection. Diabetic Foot Education. Patient reminded of the importance of good nutrition and blood sugar control to help prevent podiatric complications of diabetes. Patient instructed on proper foot hygeine. We discussed wearing proper shoe gear, daily foot inspections, never walking without protective shoe gear, never putting sharp instruments to feet    With patient's permission, nails were aggressively reduced and debrided x 10 to their soft tissue attachment mechanically and with electric , removing all offending nail and debris. Patient relates relief following the procedure. She will continue to monitor the areas daily, inspect her feet, wear protective shoe gear when ambulatory, moisturizer to maintain skin integrity and follow in this office in approximately 2-3 months, sooner p.r.n.    Follow up in about 3 months (around 1/21/2021).    Sandoval Pierre DPM

## 2021-01-26 ENCOUNTER — OFFICE VISIT (OUTPATIENT)
Dept: PODIATRY | Facility: CLINIC | Age: 71
End: 2021-01-26
Payer: COMMERCIAL

## 2021-01-26 VITALS — HEIGHT: 65 IN | BODY MASS INDEX: 46.51 KG/M2 | WEIGHT: 279.13 LBS

## 2021-01-26 DIAGNOSIS — E66.01 MORBID OBESITY WITH BMI OF 45.0-49.9, ADULT: ICD-10-CM

## 2021-01-26 DIAGNOSIS — E11.42 DIABETIC POLYNEUROPATHY ASSOCIATED WITH TYPE 2 DIABETES MELLITUS: Primary | ICD-10-CM

## 2021-01-26 DIAGNOSIS — B35.1 ONYCHOMYCOSIS DUE TO DERMATOPHYTE: ICD-10-CM

## 2021-01-26 DIAGNOSIS — I87.2 VENOUS INSUFFICIENCY: ICD-10-CM

## 2021-01-26 DIAGNOSIS — R60.0 PERIPHERAL EDEMA: ICD-10-CM

## 2021-01-26 PROCEDURE — 99213 PR OFFICE/OUTPT VISIT, EST, LEVL III, 20-29 MIN: ICD-10-PCS | Mod: 25,S$GLB,, | Performed by: PODIATRIST

## 2021-01-26 PROCEDURE — 99999 PR PBB SHADOW E&M-EST. PATIENT-LVL III: CPT | Mod: PBBFAC,,, | Performed by: PODIATRIST

## 2021-01-26 PROCEDURE — 99213 OFFICE O/P EST LOW 20 MIN: CPT | Mod: 25,S$GLB,, | Performed by: PODIATRIST

## 2021-01-26 PROCEDURE — 11721 PR DEBRIDEMENT OF NAILS, 6 OR MORE: ICD-10-PCS | Mod: S$GLB,,, | Performed by: PODIATRIST

## 2021-01-26 PROCEDURE — 11721 DEBRIDE NAIL 6 OR MORE: CPT | Mod: S$GLB,,, | Performed by: PODIATRIST

## 2021-01-26 PROCEDURE — 99999 PR PBB SHADOW E&M-EST. PATIENT-LVL III: ICD-10-PCS | Mod: PBBFAC,,, | Performed by: PODIATRIST

## 2021-03-24 PROBLEM — Z86.010 HISTORY OF COLON POLYPS: Status: ACTIVE | Noted: 2021-03-24

## 2021-03-24 PROBLEM — Z86.0100 HISTORY OF COLON POLYPS: Status: ACTIVE | Noted: 2021-03-24

## 2021-04-14 PROBLEM — R60.0 BILATERAL LOWER EXTREMITY EDEMA: Status: ACTIVE | Noted: 2021-04-14

## 2021-04-14 PROBLEM — I87.2 VENOUS REFLUX: Status: ACTIVE | Noted: 2021-04-14

## 2021-04-14 PROBLEM — I25.10 CORONARY ARTERY DISEASE DUE TO CALCIFIED CORONARY LESION: Status: ACTIVE | Noted: 2021-04-14

## 2021-04-14 PROBLEM — I25.84 CORONARY ARTERY DISEASE DUE TO CALCIFIED CORONARY LESION: Status: ACTIVE | Noted: 2021-04-14

## 2021-06-11 ENCOUNTER — OFFICE VISIT (OUTPATIENT)
Dept: PODIATRY | Facility: CLINIC | Age: 71
End: 2021-06-11
Payer: COMMERCIAL

## 2021-06-11 DIAGNOSIS — R60.0 PERIPHERAL EDEMA: ICD-10-CM

## 2021-06-11 DIAGNOSIS — B35.1 ONYCHOMYCOSIS DUE TO DERMATOPHYTE: ICD-10-CM

## 2021-06-11 DIAGNOSIS — E11.42 DIABETIC POLYNEUROPATHY ASSOCIATED WITH TYPE 2 DIABETES MELLITUS: Primary | ICD-10-CM

## 2021-06-11 DIAGNOSIS — I87.2 VENOUS INSUFFICIENCY: ICD-10-CM

## 2021-06-11 PROCEDURE — 99999 PR PBB SHADOW E&M-EST. PATIENT-LVL I: ICD-10-PCS | Mod: PBBFAC,,, | Performed by: PODIATRIST

## 2021-06-11 PROCEDURE — 99499 UNLISTED E&M SERVICE: CPT | Mod: S$GLB,,, | Performed by: PODIATRIST

## 2021-06-11 PROCEDURE — 11721 DEBRIDE NAIL 6 OR MORE: CPT | Mod: S$GLB,,, | Performed by: PODIATRIST

## 2021-06-11 PROCEDURE — 99499 NO LOS: ICD-10-PCS | Mod: S$GLB,,, | Performed by: PODIATRIST

## 2021-06-11 PROCEDURE — 99999 PR PBB SHADOW E&M-EST. PATIENT-LVL I: CPT | Mod: PBBFAC,,, | Performed by: PODIATRIST

## 2021-06-11 PROCEDURE — 11721 PR DEBRIDEMENT OF NAILS, 6 OR MORE: ICD-10-PCS | Mod: S$GLB,,, | Performed by: PODIATRIST

## 2021-06-30 ENCOUNTER — TELEPHONE (OUTPATIENT)
Dept: PODIATRY | Facility: CLINIC | Age: 71
End: 2021-06-30

## 2021-06-30 DIAGNOSIS — I87.2 VENOUS INSUFFICIENCY: Primary | ICD-10-CM

## 2021-06-30 DIAGNOSIS — R60.0 PERIPHERAL EDEMA: ICD-10-CM

## 2021-07-27 ENCOUNTER — OCCUPATIONAL HEALTH (OUTPATIENT)
Dept: URGENT CARE | Facility: CLINIC | Age: 71
End: 2021-07-27

## 2021-07-27 DIAGNOSIS — Z02.83 ENCOUNTER FOR DRUG SCREENING: Primary | ICD-10-CM

## 2021-07-27 LAB
CTP QC/QA: YES
POC 5 PANEL DRUG SCREEN: NEGATIVE

## 2021-07-27 PROCEDURE — 80305 DRUG TEST PRSMV DIR OPT OBS: CPT | Mod: S$GLB,,, | Performed by: FAMILY MEDICINE

## 2021-07-27 PROCEDURE — 80305 POCT RAPID DRUG SCREEN 5 PANEL: ICD-10-PCS | Mod: S$GLB,,, | Performed by: FAMILY MEDICINE

## 2021-09-29 ENCOUNTER — OFFICE VISIT (OUTPATIENT)
Dept: PODIATRY | Facility: CLINIC | Age: 71
End: 2021-09-29
Payer: MEDICARE

## 2021-09-29 DIAGNOSIS — I87.2 VENOUS INSUFFICIENCY: ICD-10-CM

## 2021-09-29 DIAGNOSIS — E11.42 DIABETIC POLYNEUROPATHY ASSOCIATED WITH TYPE 2 DIABETES MELLITUS: Primary | ICD-10-CM

## 2021-09-29 DIAGNOSIS — R60.0 PERIPHERAL EDEMA: ICD-10-CM

## 2021-09-29 DIAGNOSIS — B35.1 ONYCHOMYCOSIS DUE TO DERMATOPHYTE: ICD-10-CM

## 2021-09-29 PROCEDURE — 1101F PR PT FALLS ASSESS DOC 0-1 FALLS W/OUT INJ PAST YR: ICD-10-PCS | Mod: CPTII,S$GLB,, | Performed by: PODIATRIST

## 2021-09-29 PROCEDURE — 1159F PR MEDICATION LIST DOCUMENTED IN MEDICAL RECORD: ICD-10-PCS | Mod: CPTII,S$GLB,, | Performed by: PODIATRIST

## 2021-09-29 PROCEDURE — 99499 UNLISTED E&M SERVICE: CPT | Mod: S$GLB,,, | Performed by: PODIATRIST

## 2021-09-29 PROCEDURE — 3288F PR FALLS RISK ASSESSMENT DOCUMENTED: ICD-10-PCS | Mod: CPTII,S$GLB,, | Performed by: PODIATRIST

## 2021-09-29 PROCEDURE — 99499 NO LOS: ICD-10-PCS | Mod: S$GLB,,, | Performed by: PODIATRIST

## 2021-09-29 PROCEDURE — 11721 DEBRIDE NAIL 6 OR MORE: CPT | Mod: Q9,S$GLB,, | Performed by: PODIATRIST

## 2021-09-29 PROCEDURE — 99999 PR PBB SHADOW E&M-EST. PATIENT-LVL I: CPT | Mod: PBBFAC,,, | Performed by: PODIATRIST

## 2021-09-29 PROCEDURE — 3051F HG A1C>EQUAL 7.0%<8.0%: CPT | Mod: CPTII,S$GLB,, | Performed by: PODIATRIST

## 2021-09-29 PROCEDURE — 11721 PR DEBRIDEMENT OF NAILS, 6 OR MORE: ICD-10-PCS | Mod: Q9,S$GLB,, | Performed by: PODIATRIST

## 2021-09-29 PROCEDURE — 1159F MED LIST DOCD IN RCRD: CPT | Mod: CPTII,S$GLB,, | Performed by: PODIATRIST

## 2021-09-29 PROCEDURE — 3288F FALL RISK ASSESSMENT DOCD: CPT | Mod: CPTII,S$GLB,, | Performed by: PODIATRIST

## 2021-09-29 PROCEDURE — 1101F PT FALLS ASSESS-DOCD LE1/YR: CPT | Mod: CPTII,S$GLB,, | Performed by: PODIATRIST

## 2021-09-29 PROCEDURE — 99999 PR PBB SHADOW E&M-EST. PATIENT-LVL I: ICD-10-PCS | Mod: PBBFAC,,, | Performed by: PODIATRIST

## 2021-09-29 PROCEDURE — 3051F PR MOST RECENT HEMOGLOBIN A1C LEVEL 7.0 - < 8.0%: ICD-10-PCS | Mod: CPTII,S$GLB,, | Performed by: PODIATRIST

## 2022-02-15 ENCOUNTER — OFFICE VISIT (OUTPATIENT)
Dept: PODIATRY | Facility: CLINIC | Age: 72
End: 2022-02-15
Payer: MEDICARE

## 2022-02-15 DIAGNOSIS — R60.0 PERIPHERAL EDEMA: ICD-10-CM

## 2022-02-15 DIAGNOSIS — I87.2 VENOUS INSUFFICIENCY: ICD-10-CM

## 2022-02-15 DIAGNOSIS — E11.42 DIABETIC POLYNEUROPATHY ASSOCIATED WITH TYPE 2 DIABETES MELLITUS: Primary | ICD-10-CM

## 2022-02-15 DIAGNOSIS — B35.1 ONYCHOMYCOSIS DUE TO DERMATOPHYTE: ICD-10-CM

## 2022-02-15 PROCEDURE — 11721 PR DEBRIDEMENT OF NAILS, 6 OR MORE: ICD-10-PCS | Mod: Q9,S$GLB,, | Performed by: PODIATRIST

## 2022-02-15 PROCEDURE — 99213 OFFICE O/P EST LOW 20 MIN: CPT | Mod: 25,S$GLB,, | Performed by: PODIATRIST

## 2022-02-15 PROCEDURE — 3051F HG A1C>EQUAL 7.0%<8.0%: CPT | Mod: CPTII,S$GLB,, | Performed by: PODIATRIST

## 2022-02-15 PROCEDURE — 1159F MED LIST DOCD IN RCRD: CPT | Mod: CPTII,S$GLB,, | Performed by: PODIATRIST

## 2022-02-15 PROCEDURE — 99999 PR PBB SHADOW E&M-EST. PATIENT-LVL III: CPT | Mod: PBBFAC,,, | Performed by: PODIATRIST

## 2022-02-15 PROCEDURE — 3051F PR MOST RECENT HEMOGLOBIN A1C LEVEL 7.0 - < 8.0%: ICD-10-PCS | Mod: CPTII,S$GLB,, | Performed by: PODIATRIST

## 2022-02-15 PROCEDURE — 1159F PR MEDICATION LIST DOCUMENTED IN MEDICAL RECORD: ICD-10-PCS | Mod: CPTII,S$GLB,, | Performed by: PODIATRIST

## 2022-02-15 PROCEDURE — 11721 DEBRIDE NAIL 6 OR MORE: CPT | Mod: Q9,S$GLB,, | Performed by: PODIATRIST

## 2022-02-15 PROCEDURE — 99213 PR OFFICE/OUTPT VISIT, EST, LEVL III, 20-29 MIN: ICD-10-PCS | Mod: 25,S$GLB,, | Performed by: PODIATRIST

## 2022-02-15 PROCEDURE — 99999 PR PBB SHADOW E&M-EST. PATIENT-LVL III: ICD-10-PCS | Mod: PBBFAC,,, | Performed by: PODIATRIST

## 2022-02-16 NOTE — PROGRESS NOTES
Subjective:      Patient ID: Alana Snow is a 71 y.o. female.    Chief Complaint: Diabetic Foot Exam (10/1/21 [place])    Alana is a 71 y.o. female who presents to the clinic for evaluation and treatment of diabetic feet. Alana has a past medical history of AVM (arteriovenous malformation) of small bowel, acquired, Cataract, Colon polyp (01/11/2016), Coronary artery disease, Diabetes mellitus, type 2, Diverticulitis, DJD (degenerative joint disease) of knee, DJD (degenerative joint disease) of knee (3/31/2016), Drop foot gait, Hypertension, Internal hemorrhoid (01/11/2016), and Plantar fasciitis. Patient relates having toenails that are in need of trimming.  Denies being painful with wearing shoe gear.  Has not attempted to self trim.  Denies overt neuropathy pain with today's exam.  States the Lt. Lower leg remains swollen even after having the prior EVLT.  Denies this being a source of pain.  Denies any additional pedal complaints.       PCP: Jessica Hartmann MD    Date Last Seen by PCP: 10/21    Hemoglobin A1C   Date Value Ref Range Status   10/02/2021 7.4 (H) 0.0 - 5.6 % Final     Comment:     Reference Interval:  5.0 - 5.6 Normal   5.7 - 6.4 High Risk   > 6.5 Diabetic      Hgb A1c results are standardized based on the (NGSP) National   Glycohemoglobin Standardization Program.      Hemoglobin A1C levels are related to mean serum/plasma glucose   during the preceding 2-3 months.        01/30/2021 7.2 (H) 0.0 - 5.6 % Final     Comment:     Reference Interval:  5.0 - 5.6 Normal   5.7 - 6.4 High Risk   > 6.5 Diabetic      Hgb A1c results are standardized based on the (NGSP) National   Glycohemoglobin Standardization Program.      Hemoglobin A1C levels are related to mean serum/plasma glucose   during the preceding 2-3 months.        07/18/2020 7.6 (H) 0.0 - 5.6 % Final     Comment:     Reference Interval:  5.0 - 5.6 Normal   5.7 - 6.4 High Risk   > 6.5 Diabetic    Hgb A1c results are standardized  based on the (NGSP) National   Glycohemoglobin Standardization Program.    Hemoglobin A1C levels are related to mean serum/plasma glucose   during the preceding 2-3 months.                Past Medical History:   Diagnosis Date    AVM (arteriovenous malformation) of small bowel, acquired     Cataract     Colon polyp 01/11/2016    Coronary artery disease     Diabetes mellitus, type 2     Diverticulitis     DJD (degenerative joint disease) of knee     left    DJD (degenerative joint disease) of knee 3/31/2016    Drop foot gait     right    Hypertension     Internal hemorrhoid 01/11/2016    Plantar fasciitis        Past Surgical History:   Procedure Laterality Date    ABDOMINAL SURGERY      Cholecystectomy    BREAST BIOPSY Right     excisional over 10 yrs. benign    BREAST CYST ASPIRATION      CARPAL TUNNEL RELEASE Left 6/26/10    per Dr. Ramirez    CARPAL TUNNEL RELEASE Right 8/14/10    per Dr. Ramirez    COLON SURGERY      sigmoidectomy    COLONOSCOPY N/A 1/11/2016    Procedure: COLONOSCOPY;  Surgeon: Blair López MD;  Location: [facility] ENDO;  Service: Endoscopy;  Laterality: N/A;    COLONOSCOPY N/A 3/24/2021    Procedure: COLONOSCOPY;  Surgeon: Blair López MD;  Location: [facility] ENDO;  Service: Endoscopy;  Laterality: N/A;    CORONARY STENT PLACEMENT      PILONIDAL CYST DRAINAGE      WRIST FRACTURE SURGERY Right 01/10/2018    clsd reduction w/application ex-fix       Family History   Problem Relation Age of Onset    Diabetes Mother     Cancer Father     Coronary artery disease Father     Breast cancer Paternal Grandmother         age unknown    Breast cancer Paternal Aunt         age unknown       Social History     Socioeconomic History    Marital status:    Tobacco Use    Smoking status: Never Smoker    Smokeless tobacco: Never Used   Substance and Sexual Activity    Alcohol use: No     Alcohol/week: 0.0 standard drinks    Drug use: No       Current Outpatient  "Medications   Medication Sig Dispense Refill    alcohol swabs (BD ALCOHOL SWABS) PadM Apply 1 each topically 3 (three) times daily. 200 each 6    atorvastatin (LIPITOR) 40 MG tablet Take 1 tablet (40 mg total) by mouth once daily. 90 tablet 1    blood sugar diagnostic (TRUE METRIX GLUCOSE TEST STRIP) Strp Take glucose TID. 200 each 6    blood-glucose meter (FREESTYLE LITE METER) kit Use as instructed 1 each 0    blood-glucose meter (TRUE METRIX GLUCOSE METER) Misc Take glucose TID. 1 each 0    C,E,zinc,copper 11-omega3s-lut 250-5-1 mg Cap Take 1 capsule by mouth once daily.       cholecalciferol, vitamin D3, (VITAMIN D3) 5,000 unit Tab Take 2 on MWF and 1 the other days (Patient taking differently: Take 5,000 Units by mouth once daily.)      citalopram (CELEXA) 20 MG tablet Take 1 tablet (20 mg total) by mouth once daily. 90 tablet 1    clopidogreL (PLAVIX) 75 mg tablet Take 1 tablet (75 mg total) by mouth once daily. 90 tablet 1    coQ10, ubiquinol, 100 mg Cap Take 1 capsule by mouth once daily.       FUSION PLUS 130 mg iron -1,250 mcg Cap TAKE 1 CAPSULE BY MOUTH DAILY to replace iron (Patient taking differently: Take 1 capsule by mouth every other day.) 90 capsule 4    glimepiride (AMARYL) 4 MG tablet Take 1 tablet (4 mg total) by mouth once daily. 90 tablet 1    insulin (LANTUS SOLOSTAR U-100 INSULIN) glargine 100 units/mL (3mL) SubQ pen Inject 20 Units into the skin every evening. 18 mL 3    lancets (TRUEPLUS LANCETS) 28 gauge Misc Take glucose TID. 200 each 6    lansoprazole (PREVACID) 30 MG capsule Take 1 capsule (30 mg total) by mouth once daily. 90 capsule 1    METANX, ALGAL OIL, 3 mg-35 mg-2 mg -90.314 mg Cap Take 1 capsule by mouth 2 (two) times daily. 180 capsule 3    pen needle, diabetic (SURE COMFORT PEN NEEDLE) 32 gauge x 5/32" Ndle use as directed with lantus 200 each 6    SITagliptin (JANUVIA) 100 MG Tab Take 1 tablet (100 mg total) by mouth once daily. 90 tablet 1     No current " facility-administered medications for this visit.       Review of patient's allergies indicates:   Allergen Reactions    Bacitracin Dermatitis     Tested positive for contact allergy to bacitracin (should also avoid neomycin, and polymixin if possible)--marino Obrien, 2015    Balsam peru Rash     Tested positive for contact allergy to Balsam of peru,used in flavorings and fragrances--had very pruritic, extensive rash but not clear which products provoked it.  marino Obrien, 2015    Epoxy resin Rash     Tested positive for contact allergy to epoxy resin--also to Carba mix, involved in rubber mfg--but couldn't pull that up     Ethylenediamine (bulk) Rash     Tested positive for contact allergy to ethylenediamine dihydrochloride, Marino Obrien, 2015--after prolonged pruritic rash---often present in fungicides, antibiotic creams, eye and nose drops.    Gold sodium thiosulfate Rash     Tested positive for contact allergy by marino Obrien, 2015--after having severe pruritic rashes    Latex, natural rubber Rash    Metformin Diarrhea     Really affected things    Sulfa (sulfonamide antibiotics) Other (See Comments)     Affects liver    Tixocortol pivalate Rash     --tested positive for contact allergy to tixocortol-21-pivalate, commonly found in topical anti-inflammatory products and related corticosteroids    Chromium and derivatives Rash    Gold salts     Losartan Rash         Review of Systems   Constitutional: Negative for chills and fever.   Cardiovascular: Positive for leg swelling. Negative for claudication.   Skin: Positive for color change and nail changes.   Musculoskeletal: Positive for joint swelling. Negative for joint pain, muscle cramps, muscle weakness and myalgias.   Neurological: Positive for numbness. Negative for paresthesias.   Psychiatric/Behavioral: Negative for altered mental status.           Objective:      Physical Exam  Constitutional:       General: She is not in  acute distress.     Appearance: She is well-developed. She is not diaphoretic.   Cardiovascular:      Pulses:           Dorsalis pedis pulses are 2+ on the right side and 2+ on the left side.        Posterior tibial pulses are 2+ on the right side and 2+ on the left side.      Comments: CFT is < 3 seconds bilateral.  Pedal hair growth is decreased bilateral.  Varicosities noted bilateral.  Moderate non pitting edema noted to bilateral lower extremity.  Toes are cool to touch bilateral.    Musculoskeletal:         General: No tenderness.      Right lower leg: Edema present.      Left lower leg: Edema present.      Comments: Muscle strength 5/5 in all muscle groups bilateral.  No tenderness nor crepitation with ROM of foot/ankle joints bilateral.  No tenderness with palpation of bilateral foot and ankle.  Bilateral pes planus foot type.  Rectus alignment of all toes bilateral.     Skin:     General: Skin is warm and dry.      Capillary Refill: Capillary refill takes 2 to 3 seconds.      Coloration: Skin is not pale.      Findings: No abrasion, bruising, burn, ecchymosis, erythema, laceration, lesion, rash or wound.      Comments: Pedal skin appears edematous bilateral.  Toenails x 10 appear thickened by 2 mm, elongated by 5 mm, and discolored with subungual debris. Examination of the skin reveals no evidence of significant maceration, rashes, open lesions, suspicious appearing nevi or other concerning lesions.    Neurological:      Mental Status: She is alert and oriented to person, place, and time.      Sensory: Sensory deficit present.      Motor: Motor function is intact. No weakness or atrophy.      Comments: Protective sensation per [place] monofilament is intact bilateral.  Vibratory sensation is decreased bilateral.  Light touch is intact bilateral.               Assessment:       Encounter Diagnoses   Name Primary?    Diabetic polyneuropathy associated with type 2 diabetes mellitus Yes    Venous  insufficiency     Peripheral edema     Onychomycosis due to dermatophyte          Plan:       Alana was seen today for diabetic foot exam.    Diagnoses and all orders for this visit:    Diabetic polyneuropathy associated with type 2 diabetes mellitus    Venous insufficiency    Peripheral edema    Onychomycosis due to dermatophyte      I counseled the patient on her conditions, their implications and medical management.    Patient to continue elevation of the lower extremities while at rest along with use of compression while legs are held in a dependent position.      Shoe inspection. Diabetic Foot Education. Patient reminded of the importance of good nutrition and blood sugar control to help prevent podiatric complications of diabetes. Patient instructed on proper foot hygeine. We discussed wearing proper shoe gear, daily foot inspections, never walking without protective shoe gear, never putting sharp instruments to feet    With patient's permission, nails were aggressively reduced and debrided x 10 to their soft tissue attachment mechanically and with electric , removing all offending nail and debris. Patient relates relief following the procedure. She will continue to monitor the areas daily, inspect her feet, wear protective shoe gear when ambulatory, moisturizer to maintain skin integrity and follow in this office in approximately 3 months, sooner p.r.n.    Follow up in about 3 months (around 5/15/2022).    Sandoval Pierre DPM

## 2022-05-17 ENCOUNTER — OFFICE VISIT (OUTPATIENT)
Dept: PODIATRY | Facility: CLINIC | Age: 72
End: 2022-05-17
Payer: MEDICARE

## 2022-05-17 VITALS — WEIGHT: 279.13 LBS | HEIGHT: 65 IN | BODY MASS INDEX: 46.51 KG/M2

## 2022-05-17 DIAGNOSIS — E11.42 DIABETIC POLYNEUROPATHY ASSOCIATED WITH TYPE 2 DIABETES MELLITUS: Primary | ICD-10-CM

## 2022-05-17 DIAGNOSIS — B35.1 ONYCHOMYCOSIS DUE TO DERMATOPHYTE: ICD-10-CM

## 2022-05-17 DIAGNOSIS — I87.2 VENOUS INSUFFICIENCY: ICD-10-CM

## 2022-05-17 DIAGNOSIS — E66.01 MORBID OBESITY WITH BMI OF 45.0-49.9, ADULT: ICD-10-CM

## 2022-05-17 DIAGNOSIS — R60.0 PERIPHERAL EDEMA: ICD-10-CM

## 2022-05-17 DIAGNOSIS — L84 CORN OR CALLUS: ICD-10-CM

## 2022-05-17 PROCEDURE — 3008F BODY MASS INDEX DOCD: CPT | Mod: CPTII,S$GLB,, | Performed by: PODIATRIST

## 2022-05-17 PROCEDURE — 11056 PR TRIM BENIGN HYPERKERATOTIC SKIN LESION,2-4: ICD-10-PCS | Mod: Q9,S$GLB,, | Performed by: PODIATRIST

## 2022-05-17 PROCEDURE — 11721 DEBRIDE NAIL 6 OR MORE: CPT | Mod: Q9,59,S$GLB, | Performed by: PODIATRIST

## 2022-05-17 PROCEDURE — 3051F HG A1C>EQUAL 7.0%<8.0%: CPT | Mod: CPTII,S$GLB,, | Performed by: PODIATRIST

## 2022-05-17 PROCEDURE — 11056 PARNG/CUTG B9 HYPRKR LES 2-4: CPT | Mod: Q9,S$GLB,, | Performed by: PODIATRIST

## 2022-05-17 PROCEDURE — 1125F AMNT PAIN NOTED PAIN PRSNT: CPT | Mod: CPTII,S$GLB,, | Performed by: PODIATRIST

## 2022-05-17 PROCEDURE — 3008F PR BODY MASS INDEX (BMI) DOCUMENTED: ICD-10-PCS | Mod: CPTII,S$GLB,, | Performed by: PODIATRIST

## 2022-05-17 PROCEDURE — 99499 NO LOS: ICD-10-PCS | Mod: S$GLB,,, | Performed by: PODIATRIST

## 2022-05-17 PROCEDURE — 99999 PR PBB SHADOW E&M-EST. PATIENT-LVL III: CPT | Mod: PBBFAC,,, | Performed by: PODIATRIST

## 2022-05-17 PROCEDURE — 1125F PR PAIN SEVERITY QUANTIFIED, PAIN PRESENT: ICD-10-PCS | Mod: CPTII,S$GLB,, | Performed by: PODIATRIST

## 2022-05-17 PROCEDURE — 99999 PR PBB SHADOW E&M-EST. PATIENT-LVL III: ICD-10-PCS | Mod: PBBFAC,,, | Performed by: PODIATRIST

## 2022-05-17 PROCEDURE — 1159F MED LIST DOCD IN RCRD: CPT | Mod: CPTII,S$GLB,, | Performed by: PODIATRIST

## 2022-05-17 PROCEDURE — 1159F PR MEDICATION LIST DOCUMENTED IN MEDICAL RECORD: ICD-10-PCS | Mod: CPTII,S$GLB,, | Performed by: PODIATRIST

## 2022-05-17 PROCEDURE — 11721 PR DEBRIDEMENT OF NAILS, 6 OR MORE: ICD-10-PCS | Mod: Q9,59,S$GLB, | Performed by: PODIATRIST

## 2022-05-17 PROCEDURE — 99499 UNLISTED E&M SERVICE: CPT | Mod: S$GLB,,, | Performed by: PODIATRIST

## 2022-05-17 PROCEDURE — 3051F PR MOST RECENT HEMOGLOBIN A1C LEVEL 7.0 - < 8.0%: ICD-10-PCS | Mod: CPTII,S$GLB,, | Performed by: PODIATRIST

## 2022-05-18 NOTE — PROGRESS NOTES
Subjective:      Patient ID: Alana Snow is a 72 y.o. female.    Chief Complaint: Diabetes Mellitus and Diabetic Foot Exam (Mary Kate AGUILERA Lv 3/14/22)    Alana is a 72 y.o. female who presents to the clinic for evaluation and treatment of diabetic feet. Alana has a past medical history of AVM (arteriovenous malformation) of small bowel, acquired, Cataract, Colon polyp (01/11/2016), Coronary artery disease, Diabetes mellitus, type 2, Diverticulitis, DJD (degenerative joint disease) of knee, DJD (degenerative joint disease) of knee (3/31/2016), Drop foot gait, Hypertension, Internal hemorrhoid (01/11/2016), and Plantar fasciitis. Patient relates having toenails that are in need of trimming.  Denies being painful with wearing shoe gear.  Has not attempted to self trim.  Relates having painful calluses to the lateral aspect of bilateral 5th mtp joints.  Describes pain from calluses as sharp and rates as a 4/10.  Symptoms are aggravated with wearing all shoe gear and alleviated upon removal.  Has not attempted to self treat.  Denies overt neuropathy pain with today's exam.  Notes the lower extremities have been excessively swollen for the past couple of weeks.  She feels as though the past EVLT did little to mitigate swelling in the Lt. Lower leg.  Inquires as to whether she should see her Cardiologist in regards to this issue. Denies any additional pedal complaints.       PCP: Jessica Hartmann MD    Date Last Seen by PCP: 3/22    Hemoglobin A1C   Date Value Ref Range Status   03/11/2022 7.4 (H) 0.0 - 5.6 % Final     Comment:     Reference Interval:  5.0 - 5.6 Normal   5.7 - 6.4 High Risk   > 6.5 Diabetic      Hgb A1c results are standardized based on the (NGSP) National   Glycohemoglobin Standardization Program.      Hemoglobin A1C levels are related to mean serum/plasma glucose   during the preceding 2-3 months.        10/02/2021 7.4 (H) 0.0 - 5.6 % Final     Comment:     Reference Interval:  5.0 - 5.6  Normal   5.7 - 6.4 High Risk   > 6.5 Diabetic      Hgb A1c results are standardized based on the (NGSP) National   Glycohemoglobin Standardization Program.      Hemoglobin A1C levels are related to mean serum/plasma glucose   during the preceding 2-3 months.        01/30/2021 7.2 (H) 0.0 - 5.6 % Final     Comment:     Reference Interval:  5.0 - 5.6 Normal   5.7 - 6.4 High Risk   > 6.5 Diabetic      Hgb A1c results are standardized based on the (NGSP) National   Glycohemoglobin Standardization Program.      Hemoglobin A1C levels are related to mean serum/plasma glucose   during the preceding 2-3 months.                Past Medical History:   Diagnosis Date    AVM (arteriovenous malformation) of small bowel, acquired     Cataract     Colon polyp 01/11/2016    Coronary artery disease     Diabetes mellitus, type 2     Diverticulitis     DJD (degenerative joint disease) of knee     left    DJD (degenerative joint disease) of knee 3/31/2016    Drop foot gait     right    Hypertension     Internal hemorrhoid 01/11/2016    Plantar fasciitis        Past Surgical History:   Procedure Laterality Date    ABDOMINAL SURGERY      Cholecystectomy    BREAST BIOPSY Right     excisional over 10 yrs. benign    BREAST CYST ASPIRATION      CARPAL TUNNEL RELEASE Left 6/26/10    per Dr. Ramirez    CARPAL TUNNEL RELEASE Right 8/14/10    per Dr. Ramirez    COLON SURGERY      sigmoidectomy    COLONOSCOPY N/A 1/11/2016    Procedure: COLONOSCOPY;  Surgeon: Blair López MD;  Location: [facility] ENDO;  Service: Endoscopy;  Laterality: N/A;    COLONOSCOPY N/A 3/24/2021    Procedure: COLONOSCOPY;  Surgeon: Blair López MD;  Location: [facility] ENDO;  Service: Endoscopy;  Laterality: N/A;    CORONARY STENT PLACEMENT      PILONIDAL CYST DRAINAGE      WRIST FRACTURE SURGERY Right 01/10/2018    clsd reduction w/application ex-fix       Family History   Problem Relation Age of Onset    Diabetes Mother     Cancer Father      Coronary artery disease Father     Breast cancer Paternal Grandmother         age unknown    Breast cancer Paternal Aunt         age unknown       Social History     Socioeconomic History    Marital status:    Tobacco Use    Smoking status: Never Smoker    Smokeless tobacco: Never Used   Substance and Sexual Activity    Alcohol use: No     Alcohol/week: 0.0 standard drinks    Drug use: No     Social Determinants of Health     Financial Resource Strain: Low Risk     Difficulty of Paying Living Expenses: Not hard at all   Food Insecurity: Food Insecurity Present    Worried About Running Out of Food in the Last Year: Sometimes true    Ran Out of Food in the Last Year: Never true   Transportation Needs: No Transportation Needs    Lack of Transportation (Medical): No    Lack of Transportation (Non-Medical): No   Physical Activity: Unknown    Days of Exercise per Week: 5 days   Stress: Stress Concern Present    Feeling of Stress : Rather much   Social Connections: Unknown    Frequency of Communication with Friends and Family: More than three times a week    Frequency of Social Gatherings with Friends and Family: More than three times a week    Active Member of Clubs or Organizations: Yes    Attends Club or Organization Meetings: More than 4 times per year    Marital Status:    Housing Stability: Unknown    Unable to Pay for Housing in the Last Year: No    Unstable Housing in the Last Year: No       Current Outpatient Medications   Medication Sig Dispense Refill    alcohol swabs (BD ALCOHOL SWABS) PadM Apply 1 each topically 3 (three) times daily. 200 each 6    atorvastatin (LIPITOR) 40 MG tablet TAKE 1 TABLET (40 MG TOTAL) BY MOUTH ONCE DAILY. 90 tablet 1    blood sugar diagnostic (TRUE METRIX GLUCOSE TEST STRIP) Strp Take glucose TID. 200 each 6    blood-glucose meter (FREESTYLE LITE METER) kit Use as instructed 1 each 0    blood-glucose meter (TRUE METRIX GLUCOSE METER) Misc  "Take glucose TID. 1 each 0    C,E,zinc,copper 11-omega3s-lut 250-5-1 mg Cap Take 1 capsule by mouth once daily.       cholecalciferol, vitamin D3, (VITAMIN D3) 5,000 unit Tab Take 2 on MWF and 1 the other days (Patient taking differently: Take 5,000 Units by mouth once daily.)      citalopram (CELEXA) 20 MG tablet TAKE 1 TABLET EVERY DAY 90 tablet 1    clopidogreL (PLAVIX) 75 mg tablet TAKE 1 TABLET (75 MG TOTAL) BY MOUTH ONCE DAILY. 90 tablet 1    coQ10, ubiquinol, 100 mg Cap Take 1 capsule by mouth once daily.       FUSION PLUS 130 mg iron -1,250 mcg Cap TAKE 1 CAPSULE BY MOUTH DAILY to replace iron (Patient taking differently: Take 1 capsule by mouth every other day.) 90 capsule 4    glimepiride (AMARYL) 4 MG tablet Take 1 tablet (4 mg total) by mouth once daily. 90 tablet 1    insulin (LANTUS SOLOSTAR U-100 INSULIN) glargine 100 units/mL (3mL) SubQ pen Inject 20 Units into the skin every evening. 18 mL 3    JANUVIA 100 mg Tab TAKE 1 TABLET (100 MG TOTAL) BY MOUTH ONCE DAILY. 90 tablet 1    lancets (TRUEPLUS LANCETS) 28 gauge Misc Take glucose TID. 200 each 6    lansoprazole (PREVACID) 30 MG capsule Take 1 capsule (30 mg total) by mouth once daily. 90 capsule 1    METANX, ALGAL OIL, 3 mg-35 mg-2 mg -90.314 mg Cap Take 1 capsule by mouth 2 (two) times daily. 180 capsule 3    pen needle, diabetic (SURE COMFORT PEN NEEDLE) 32 gauge x 5/32" Ndle use as directed with lantus 200 each 6     No current facility-administered medications for this visit.       Review of patient's allergies indicates:   Allergen Reactions    Bacitracin Dermatitis     Tested positive for contact allergy to bacitracin (should also avoid neomycin, and polymixin if possible)--marino Obrien, 2015    Balsam peru Rash     Tested positive for contact allergy to Balsam of peru,used in flavorings and fragrances--had very pruritic, extensive rash but not clear which products provoked it.  marino Obrien, 2015    Epoxy resin " Rash     Tested positive for contact allergy to epoxy resin--also to Carba mix, involved in rubber mfg--but couldn't pull that up     Ethylenediamine (bulk) Rash     Tested positive for contact allergy to ethylenediamine dihydrochloride, Dr Smalls Summer, 2015--after prolonged pruritic rash---often present in fungicides, antibiotic creams, eye and nose drops.    Gold sodium thiosulfate Rash     Tested positive for contact allergy by Dr Smalls summer, 2015--after having severe pruritic rashes    Latex, natural rubber Rash    Metformin Diarrhea     Really affected things    Sulfa (sulfonamide antibiotics) Other (See Comments)     Affects liver    Tixocortol pivalate Rash     --tested positive for contact allergy to tixocortol-21-pivalate, commonly found in topical anti-inflammatory products and related corticosteroids    Chromium and derivatives Rash    Gold salts     Losartan Rash         Review of Systems   Constitutional: Negative for chills and fever.   Cardiovascular: Positive for leg swelling. Negative for claudication.   Skin: Positive for color change and nail changes.   Musculoskeletal: Positive for joint swelling. Negative for joint pain, muscle cramps, muscle weakness and myalgias.   Neurological: Positive for numbness. Negative for paresthesias.   Psychiatric/Behavioral: Negative for altered mental status.           Objective:      Physical Exam  Constitutional:       General: She is not in acute distress.     Appearance: She is well-developed. She is not diaphoretic.   Cardiovascular:      Pulses:           Dorsalis pedis pulses are 2+ on the right side and 2+ on the left side.        Posterior tibial pulses are 2+ on the right side and 2+ on the left side.      Comments: CFT is < 3 seconds bilateral.  Pedal hair growth is decreased bilateral.  Varicosities noted bilateral.  Toes are cool to touch bilateral.    Musculoskeletal:         General: No tenderness.      Right lower le+ Edema  present.      Left lower le+ Edema present.      Comments: Muscle strength 5/5 in all muscle groups bilateral.  No tenderness nor crepitation with ROM of foot/ankle joints bilateral.  Pain with palpation to lesions about bilateral 5th mtp joints.  Bilateral pes planus foot type.  Rectus alignment of all toes bilateral.     Skin:     General: Skin is warm and dry.      Capillary Refill: Capillary refill takes 2 to 3 seconds.      Coloration: Skin is not pale.      Findings: Lesion present. No abrasion, bruising, burn, ecchymosis, erythema, laceration, rash or wound.      Comments: Pedal skin appears edematous bilateral.  Toenails x 10 appear thickened by 2 mm, elongated by 4 mm, and discolored with subungual debris.  Hyperkeratotic lesion noted to the lateral aspect of the Rt. 5th mtp joint.     Neurological:      Mental Status: She is alert and oriented to person, place, and time.      Sensory: Sensory deficit present.      Motor: Motor function is intact. No weakness or atrophy.      Comments: Protective sensation per Kemp-Justyna monofilament is intact bilateral.  Vibratory sensation is decreased bilateral.  Light touch is intact bilateral.               Assessment:       Encounter Diagnoses   Name Primary?    Diabetic polyneuropathy associated with type 2 diabetes mellitus Yes    Venous insufficiency     Peripheral edema     Morbid obesity with BMI of 45.0-49.9, adult     Onychomycosis due to dermatophyte     Corn or callus          Plan:       Alana was seen today for diabetes mellitus and diabetic foot exam.    Diagnoses and all orders for this visit:    Diabetic polyneuropathy associated with type 2 diabetes mellitus    Venous insufficiency    Peripheral edema    Morbid obesity with BMI of 45.0-49.9, adult    Onychomycosis due to dermatophyte    Corn or callus      I counseled the patient on her conditions, their implications and medical management.    Advised to continue elevation of the lower  extremities while at rest along with use of compression while legs are held in a dependent position.  Recommend she follow up with Cardiology for assessment and review of further options.    Discussed the importance of diet and exercise as they pertain to diabetes management and maintaining a healthy BMI.    Discussed application of Amlactin and an occlusive dressing to areas of callus build up once daily x 3 weeks.  She is also to begin filing said lesions with a pumice stone every 2-3 days to minimize callus build up.     Shoe inspection. Diabetic Foot Education. Patient reminded of the importance of good nutrition and blood sugar control to help prevent podiatric complications of diabetes. Patient instructed on proper foot hygeine. We discussed wearing proper shoe gear, daily foot inspections, never walking without protective shoe gear, never putting sharp instruments to feet    With patient's permission, nails were aggressively reduced and debrided x 10 to their soft tissue attachment mechanically and with electric , removing all offending nail and debris.  Also, a sterile #15 scalpel was used to trim lesions x 2 down to smooth appearing skin without incident.  Patient relates relief following the procedure. She will continue to monitor the areas daily, inspect her feet, wear protective shoe gear when ambulatory, moisturizer to maintain skin integrity and follow in this office in approximately 3 months, sooner p.r.n.    Follow up in about 3 months (around 8/17/2022).    Sandoval Pierre DPM

## 2022-06-30 PROBLEM — Z98.890 STATUS POST ENDOVENOUS RADIOFREQUENCY ABLATION (RFA) OF SAPHENOUS VEIN: Status: ACTIVE | Noted: 2022-06-30

## 2022-06-30 PROBLEM — R60.0 BILATERAL LOWER EXTREMITY EDEMA: Status: RESOLVED | Noted: 2021-04-14 | Resolved: 2022-06-30

## 2022-06-30 PROBLEM — E66.813 CLASS 3 SEVERE OBESITY DUE TO EXCESS CALORIES WITH SERIOUS COMORBIDITY AND BODY MASS INDEX (BMI) OF 40.0 TO 44.9 IN ADULT: Status: ACTIVE | Noted: 2019-12-09

## 2022-06-30 PROBLEM — D49.7 PITUITARY TUMOR: Status: ACTIVE | Noted: 2022-06-30

## 2022-08-23 ENCOUNTER — OFFICE VISIT (OUTPATIENT)
Dept: PODIATRY | Facility: CLINIC | Age: 72
End: 2022-08-23
Payer: MEDICARE

## 2022-08-23 VITALS — WEIGHT: 274.06 LBS | HEIGHT: 66 IN | BODY MASS INDEX: 44.04 KG/M2

## 2022-08-23 DIAGNOSIS — E11.42 DIABETIC POLYNEUROPATHY ASSOCIATED WITH TYPE 2 DIABETES MELLITUS: Primary | ICD-10-CM

## 2022-08-23 DIAGNOSIS — R60.0 PERIPHERAL EDEMA: ICD-10-CM

## 2022-08-23 DIAGNOSIS — L84 CORN OR CALLUS: ICD-10-CM

## 2022-08-23 DIAGNOSIS — I87.2 VENOUS INSUFFICIENCY: ICD-10-CM

## 2022-08-23 DIAGNOSIS — B35.1 ONYCHOMYCOSIS DUE TO DERMATOPHYTE: ICD-10-CM

## 2022-08-23 PROCEDURE — 11721 DEBRIDE NAIL 6 OR MORE: CPT | Mod: 59,Q9,S$GLB, | Performed by: PODIATRIST

## 2022-08-23 PROCEDURE — 99499 UNLISTED E&M SERVICE: CPT | Mod: S$GLB,,, | Performed by: PODIATRIST

## 2022-08-23 PROCEDURE — 11056 PARNG/CUTG B9 HYPRKR LES 2-4: CPT | Mod: Q9,S$GLB,, | Performed by: PODIATRIST

## 2022-08-23 PROCEDURE — 1101F PT FALLS ASSESS-DOCD LE1/YR: CPT | Mod: CPTII,S$GLB,, | Performed by: PODIATRIST

## 2022-08-23 PROCEDURE — 3008F PR BODY MASS INDEX (BMI) DOCUMENTED: ICD-10-PCS | Mod: CPTII,S$GLB,, | Performed by: PODIATRIST

## 2022-08-23 PROCEDURE — 11721 PR DEBRIDEMENT OF NAILS, 6 OR MORE: ICD-10-PCS | Mod: 59,Q9,S$GLB, | Performed by: PODIATRIST

## 2022-08-23 PROCEDURE — 3008F BODY MASS INDEX DOCD: CPT | Mod: CPTII,S$GLB,, | Performed by: PODIATRIST

## 2022-08-23 PROCEDURE — 1101F PR PT FALLS ASSESS DOC 0-1 FALLS W/OUT INJ PAST YR: ICD-10-PCS | Mod: CPTII,S$GLB,, | Performed by: PODIATRIST

## 2022-08-23 PROCEDURE — 11056 PR TRIM BENIGN HYPERKERATOTIC SKIN LESION,2-4: ICD-10-PCS | Mod: Q9,S$GLB,, | Performed by: PODIATRIST

## 2022-08-23 PROCEDURE — 1126F PR PAIN SEVERITY QUANTIFIED, NO PAIN PRESENT: ICD-10-PCS | Mod: CPTII,S$GLB,, | Performed by: PODIATRIST

## 2022-08-23 PROCEDURE — 3051F HG A1C>EQUAL 7.0%<8.0%: CPT | Mod: CPTII,S$GLB,, | Performed by: PODIATRIST

## 2022-08-23 PROCEDURE — 3288F PR FALLS RISK ASSESSMENT DOCUMENTED: ICD-10-PCS | Mod: CPTII,S$GLB,, | Performed by: PODIATRIST

## 2022-08-23 PROCEDURE — 1126F AMNT PAIN NOTED NONE PRSNT: CPT | Mod: CPTII,S$GLB,, | Performed by: PODIATRIST

## 2022-08-23 PROCEDURE — 3051F PR MOST RECENT HEMOGLOBIN A1C LEVEL 7.0 - < 8.0%: ICD-10-PCS | Mod: CPTII,S$GLB,, | Performed by: PODIATRIST

## 2022-08-23 PROCEDURE — 99999 PR PBB SHADOW E&M-EST. PATIENT-LVL II: CPT | Mod: PBBFAC,,, | Performed by: PODIATRIST

## 2022-08-23 PROCEDURE — 3288F FALL RISK ASSESSMENT DOCD: CPT | Mod: CPTII,S$GLB,, | Performed by: PODIATRIST

## 2022-08-23 PROCEDURE — 99499 NO LOS: ICD-10-PCS | Mod: S$GLB,,, | Performed by: PODIATRIST

## 2022-08-23 PROCEDURE — 99999 PR PBB SHADOW E&M-EST. PATIENT-LVL II: ICD-10-PCS | Mod: PBBFAC,,, | Performed by: PODIATRIST

## 2022-08-24 NOTE — PROGRESS NOTES
Subjective:      Patient ID: Alana Snow is a 72 y.o. female.    Chief Complaint: Nail Care    Alana is a 72 y.o. female who presents to the clinic for evaluation and treatment of diabetic feet. Alana has a past medical history of AVM (arteriovenous malformation) of small bowel, acquired, Cataract, Colon polyp (01/11/2016), Coronary artery disease, Diabetes mellitus, type 2, Diverticulitis, DJD (degenerative joint disease) of knee, DJD (degenerative joint disease) of knee (3/31/2016), Drop foot gait, Hypertension, Internal hemorrhoid (01/11/2016), and Plantar fasciitis. Patient relates having toenails that are in need of trimming.  Denies being painful with wearing shoe gear.  Has not attempted to self trim.  Denies overt neuropathy pain with today's exam.  Due to other medical issues, she has yet to follow up with Cardiology regarding bilateral venous insufficiency.  She continues to elevate the limbs while at rest to mitigate swelling.  Denies any additional pedal complaints.       PCP: Jessica Hartmann MD    Date Last Seen by PCP: 3/22    Hemoglobin A1C   Date Value Ref Range Status   03/11/2022 7.4 (H) 0.0 - 5.6 % Final     Comment:     Reference Interval:  5.0 - 5.6 Normal   5.7 - 6.4 High Risk   > 6.5 Diabetic      Hgb A1c results are standardized based on the (NGSP) National   Glycohemoglobin Standardization Program.      Hemoglobin A1C levels are related to mean serum/plasma glucose   during the preceding 2-3 months.        10/02/2021 7.4 (H) 0.0 - 5.6 % Final     Comment:     Reference Interval:  5.0 - 5.6 Normal   5.7 - 6.4 High Risk   > 6.5 Diabetic      Hgb A1c results are standardized based on the (NGSP) National   Glycohemoglobin Standardization Program.      Hemoglobin A1C levels are related to mean serum/plasma glucose   during the preceding 2-3 months.        01/30/2021 7.2 (H) 0.0 - 5.6 % Final     Comment:     Reference Interval:  5.0 - 5.6 Normal   5.7 - 6.4 High Risk   > 6.5  Diabetic      Hgb A1c results are standardized based on the (NGSP) National   Glycohemoglobin Standardization Program.      Hemoglobin A1C levels are related to mean serum/plasma glucose   during the preceding 2-3 months.                Past Medical History:   Diagnosis Date    AVM (arteriovenous malformation) of small bowel, acquired     Cataract     Colon polyp 01/11/2016    Coronary artery disease     Diabetes mellitus, type 2     Diverticulitis     DJD (degenerative joint disease) of knee     left    DJD (degenerative joint disease) of knee 3/31/2016    Drop foot gait     right    Hypertension     Internal hemorrhoid 01/11/2016    Plantar fasciitis        Past Surgical History:   Procedure Laterality Date    ABDOMINAL SURGERY      Cholecystectomy    BREAST BIOPSY Right     excisional over 10 yrs. benign    BREAST CYST ASPIRATION      CARPAL TUNNEL RELEASE Left 6/26/10    per Dr. Ramirez    CARPAL TUNNEL RELEASE Right 8/14/10    per Dr. Ramirez    COLON SURGERY      sigmoidectomy    COLONOSCOPY N/A 1/11/2016    Procedure: COLONOSCOPY;  Surgeon: Blair López MD;  Location: [facility] ENDO;  Service: Endoscopy;  Laterality: N/A;    COLONOSCOPY N/A 3/24/2021    Procedure: COLONOSCOPY;  Surgeon: Blair López MD;  Location: [facility] ENDO;  Service: Endoscopy;  Laterality: N/A;    CORONARY STENT PLACEMENT      PILONIDAL CYST DRAINAGE      WRIST FRACTURE SURGERY Right 01/10/2018    clsd reduction w/application ex-fix       Family History   Problem Relation Age of Onset    Diabetes Mother     Cancer Father     Coronary artery disease Father     Breast cancer Paternal Grandmother         age unknown    Breast cancer Paternal Aunt         age unknown       Social History     Socioeconomic History    Marital status:    Tobacco Use    Smoking status: Never Smoker    Smokeless tobacco: Never Used   Substance and Sexual Activity    Alcohol use: No     Alcohol/week: 0.0 standard drinks     Drug use: No     Social Determinants of Health     Financial Resource Strain: Medium Risk    Difficulty of Paying Living Expenses: Somewhat hard   Food Insecurity: Food Insecurity Present    Worried About Running Out of Food in the Last Year: Sometimes true    Ran Out of Food in the Last Year: Sometimes true   Transportation Needs: No Transportation Needs    Lack of Transportation (Medical): No    Lack of Transportation (Non-Medical): No   Physical Activity: Unknown    Days of Exercise per Week: 2 days   Stress: No Stress Concern Present    Feeling of Stress : Not at all   Social Connections: Unknown    Frequency of Communication with Friends and Family: More than three times a week    Frequency of Social Gatherings with Friends and Family: More than three times a week    Active Member of Clubs or Organizations: Yes    Attends Club or Organization Meetings: More than 4 times per year    Marital Status:    Housing Stability: Unknown    Unable to Pay for Housing in the Last Year: No    Unstable Housing in the Last Year: No       Current Outpatient Medications   Medication Sig Dispense Refill    alcohol swabs (BD ALCOHOL SWABS) PadM Apply 1 each topically 3 (three) times daily. 200 each 6    atorvastatin (LIPITOR) 40 MG tablet TAKE 1 TABLET (40 MG TOTAL) BY MOUTH ONCE DAILY. 90 tablet 1    blood sugar diagnostic (TRUE METRIX GLUCOSE TEST STRIP) Strp Take glucose TID. 200 each 6    C,E,zinc,copper 11-omega3s-lut 250-5-1 mg Cap Take 1 capsule by mouth once daily.       cholecalciferol, vitamin D3, (VITAMIN D3) 5,000 unit Tab Take 2 on MWF and 1 the other days      citalopram (CELEXA) 20 MG tablet TAKE 1 TABLET EVERY DAY 90 tablet 1    clopidogreL (PLAVIX) 75 mg tablet TAKE 1 TABLET (75 MG TOTAL) BY MOUTH ONCE DAILY. 90 tablet 1    coQ10, ubiquinol, 100 mg Cap Take 1 capsule by mouth once daily.       FUSION PLUS 130 mg iron -1,250 mcg Cap TAKE 1 CAPSULE BY MOUTH DAILY to replace iron 90  "capsule 4    glimepiride (AMARYL) 4 MG tablet Take 1 tablet (4 mg total) by mouth once daily. 90 tablet 1    insulin (LANTUS SOLOSTAR U-100 INSULIN) glargine 100 units/mL (3mL) SubQ pen Inject 20 Units into the skin every evening. 18 mL 3    JANUVIA 100 mg Tab TAKE 1 TABLET (100 MG TOTAL) BY MOUTH ONCE DAILY. 90 tablet 1    lancets (TRUEPLUS LANCETS) 28 gauge Misc Take glucose TID. 200 each 6    lansoprazole (PREVACID) 30 MG capsule Take 1 capsule (30 mg total) by mouth once daily. 90 capsule 1    METANX, ALGAL OIL, 3 mg-35 mg-2 mg -90.314 mg Cap Take 1 capsule by mouth 2 (two) times daily. 180 capsule 3    pen needle, diabetic (SURE COMFORT PEN NEEDLE) 32 gauge x 5/32" Ndle use as directed with lantus 200 each 6     No current facility-administered medications for this visit.       Review of patient's allergies indicates:   Allergen Reactions    Bacitracin Dermatitis     Tested positive for contact allergy to bacitracin (should also avoid neomycin, and polymixin if possible)--marino Obrien, 2015    Balsam peru Rash     Tested positive for contact allergy to Balsam of peru,used in flavorings and fragrances--had very pruritic, extensive rash but not clear which products provoked it.  marino Obrien, 2015    Epoxy resin Rash     Tested positive for contact allergy to epoxy resin--also to Carba mix, involved in rubber mfg--but couldn't pull that up     Ethylenediamine (bulk) Rash     Tested positive for contact allergy to ethylenediamine dihydrochloride, Marino Obrien, 2015--after prolonged pruritic rash---often present in fungicides, antibiotic creams, eye and nose drops.    Gold sodium thiosulfate Rash     Tested positive for contact allergy by marino Obrien, 2015--after having severe pruritic rashes    Latex, natural rubber Rash    Metformin Diarrhea     Really affected things    Sulfa (sulfonamide antibiotics) Other (See Comments)     Affects liver    Tixocortol pivalate Rash     " --tested positive for contact allergy to tixocortol-21-pivalate, commonly found in topical anti-inflammatory products and related corticosteroids    Chromium and derivatives Rash    Gold salts     Losartan Rash         Review of Systems   Constitutional: Negative for chills and fever.   Cardiovascular: Positive for leg swelling. Negative for claudication.   Skin: Positive for color change and nail changes.   Musculoskeletal: Positive for joint swelling. Negative for joint pain, muscle cramps, muscle weakness and myalgias.   Neurological: Positive for numbness. Negative for paresthesias.   Psychiatric/Behavioral: Negative for altered mental status.           Objective:      Physical Exam  Constitutional:       General: She is not in acute distress.     Appearance: She is well-developed. She is not diaphoretic.   Cardiovascular:      Pulses:           Dorsalis pedis pulses are 2+ on the right side and 2+ on the left side.        Posterior tibial pulses are 2+ on the right side and 2+ on the left side.      Comments: CFT is < 3 seconds bilateral.  Pedal hair growth is decreased bilateral.  Varicosities noted bilateral.  Toes are cool to touch bilateral.    Musculoskeletal:         General: No tenderness.      Right lower le+ Edema present.      Left lower le+ Edema present.      Comments: Muscle strength 5/5 in all muscle groups bilateral.  No tenderness nor crepitation with ROM of foot/ankle joints bilateral.  Pain with palpation to lesions about bilateral 5th mtp joints.  Bilateral pes planus foot type.  Rectus alignment of all toes bilateral.     Skin:     General: Skin is warm and dry.      Capillary Refill: Capillary refill takes 2 to 3 seconds.      Coloration: Skin is not pale.      Findings: Lesion present. No abrasion, bruising, burn, ecchymosis, erythema, laceration, rash or wound.      Comments: Pedal skin appears edematous bilateral.  Toenails x 10 appear thickened by 2 mm, elongated by 4 mm, and  discolored with subungual debris.  Hyperkeratotic lesion noted to the lateral aspect of bilateral 5th mtp joint.     Neurological:      Mental Status: She is alert and oriented to person, place, and time.      Sensory: Sensory deficit present.      Motor: Motor function is intact. No weakness or atrophy.      Comments: Protective sensation per [place] monofilament is intact bilateral.  Vibratory sensation is decreased bilateral.  Light touch is intact bilateral.               Assessment:       Encounter Diagnoses   Name Primary?    Diabetic polyneuropathy associated with type 2 diabetes mellitus Yes    Venous insufficiency     Peripheral edema     Onychomycosis due to dermatophyte     Corn or callus          Plan:       Alana was seen today for nail care.    Diagnoses and all orders for this visit:    Diabetic polyneuropathy associated with type 2 diabetes mellitus    Venous insufficiency    Peripheral edema    Onychomycosis due to dermatophyte    Corn or callus      I counseled the patient on her conditions, their implications and medical management.    Advised to continue elevation of the lower extremities while at rest along with use of compression while legs are held in a dependent position.  Recommend she follow up with Cardiology for assessment and review of further options.    Discussed the importance of diet and exercise as they pertain to diabetes management and maintaining a healthy BMI.    Shoe inspection. Diabetic Foot Education. Patient reminded of the importance of good nutrition and blood sugar control to help prevent podiatric complications of diabetes. Patient instructed on proper foot hygeine. We discussed wearing proper shoe gear, daily foot inspections, never walking without protective shoe gear, never putting sharp instruments to feet    With patient's permission, nails were aggressively reduced and debrided x 10 to their soft tissue attachment mechanically and with electric  , removing all offending nail and debris.  Also, a sterile #15 scalpel was used to trim lesions x 2 down to smooth appearing skin without incident.  Patient relates relief following the procedure. She will continue to monitor the areas daily, inspect her feet, wear protective shoe gear when ambulatory, moisturizer to maintain skin integrity and follow in this office in approximately 3 months, sooner p.r.n.    Follow up in about 3 months (around 11/23/2022).    Sandoval Pierre DPM

## 2022-09-02 ENCOUNTER — PATIENT MESSAGE (OUTPATIENT)
Dept: PODIATRY | Facility: CLINIC | Age: 72
End: 2022-09-02
Payer: MEDICARE

## 2022-11-16 ENCOUNTER — OFFICE VISIT (OUTPATIENT)
Dept: PODIATRY | Facility: CLINIC | Age: 72
End: 2022-11-16
Payer: MEDICARE

## 2022-11-16 DIAGNOSIS — E66.01 MORBID OBESITY WITH BMI OF 40.0-44.9, ADULT: ICD-10-CM

## 2022-11-16 DIAGNOSIS — B35.1 ONYCHOMYCOSIS DUE TO DERMATOPHYTE: ICD-10-CM

## 2022-11-16 DIAGNOSIS — E11.42 DIABETIC POLYNEUROPATHY ASSOCIATED WITH TYPE 2 DIABETES MELLITUS: Primary | ICD-10-CM

## 2022-11-16 DIAGNOSIS — I87.2 VENOUS INSUFFICIENCY: ICD-10-CM

## 2022-11-16 DIAGNOSIS — L84 CORN OR CALLUS: ICD-10-CM

## 2022-11-16 DIAGNOSIS — R60.0 PERIPHERAL EDEMA: ICD-10-CM

## 2022-11-16 PROCEDURE — 99499 UNLISTED E&M SERVICE: CPT | Mod: S$GLB,,, | Performed by: PODIATRIST

## 2022-11-16 PROCEDURE — 3066F NEPHROPATHY DOC TX: CPT | Mod: CPTII,S$GLB,, | Performed by: PODIATRIST

## 2022-11-16 PROCEDURE — 99999 PR PBB SHADOW E&M-EST. PATIENT-LVL III: CPT | Mod: PBBFAC,,, | Performed by: PODIATRIST

## 2022-11-16 PROCEDURE — 3061F NEG MICROALBUMINURIA REV: CPT | Mod: CPTII,S$GLB,, | Performed by: PODIATRIST

## 2022-11-16 PROCEDURE — 1126F PR PAIN SEVERITY QUANTIFIED, NO PAIN PRESENT: ICD-10-PCS | Mod: CPTII,S$GLB,, | Performed by: PODIATRIST

## 2022-11-16 PROCEDURE — 99499 NO LOS: ICD-10-PCS | Mod: S$GLB,,, | Performed by: PODIATRIST

## 2022-11-16 PROCEDURE — 11056 PARNG/CUTG B9 HYPRKR LES 2-4: CPT | Mod: Q9,S$GLB,, | Performed by: PODIATRIST

## 2022-11-16 PROCEDURE — 11056 PR TRIM BENIGN HYPERKERATOTIC SKIN LESION,2-4: ICD-10-PCS | Mod: Q9,S$GLB,, | Performed by: PODIATRIST

## 2022-11-16 PROCEDURE — 1126F AMNT PAIN NOTED NONE PRSNT: CPT | Mod: CPTII,S$GLB,, | Performed by: PODIATRIST

## 2022-11-16 PROCEDURE — 3061F PR NEG MICROALBUMINURIA RESULT DOCUMENTED/REVIEW: ICD-10-PCS | Mod: CPTII,S$GLB,, | Performed by: PODIATRIST

## 2022-11-16 PROCEDURE — 3051F PR MOST RECENT HEMOGLOBIN A1C LEVEL 7.0 - < 8.0%: ICD-10-PCS | Mod: CPTII,S$GLB,, | Performed by: PODIATRIST

## 2022-11-16 PROCEDURE — 3066F PR DOCUMENTATION OF TREATMENT FOR NEPHROPATHY: ICD-10-PCS | Mod: CPTII,S$GLB,, | Performed by: PODIATRIST

## 2022-11-16 PROCEDURE — 3288F PR FALLS RISK ASSESSMENT DOCUMENTED: ICD-10-PCS | Mod: CPTII,S$GLB,, | Performed by: PODIATRIST

## 2022-11-16 PROCEDURE — 1101F PR PT FALLS ASSESS DOC 0-1 FALLS W/OUT INJ PAST YR: ICD-10-PCS | Mod: CPTII,S$GLB,, | Performed by: PODIATRIST

## 2022-11-16 PROCEDURE — 3288F FALL RISK ASSESSMENT DOCD: CPT | Mod: CPTII,S$GLB,, | Performed by: PODIATRIST

## 2022-11-16 PROCEDURE — 1101F PT FALLS ASSESS-DOCD LE1/YR: CPT | Mod: CPTII,S$GLB,, | Performed by: PODIATRIST

## 2022-11-16 PROCEDURE — 11721 DEBRIDE NAIL 6 OR MORE: CPT | Mod: 59,Q9,S$GLB, | Performed by: PODIATRIST

## 2022-11-16 PROCEDURE — 1159F PR MEDICATION LIST DOCUMENTED IN MEDICAL RECORD: ICD-10-PCS | Mod: CPTII,S$GLB,, | Performed by: PODIATRIST

## 2022-11-16 PROCEDURE — 1159F MED LIST DOCD IN RCRD: CPT | Mod: CPTII,S$GLB,, | Performed by: PODIATRIST

## 2022-11-16 PROCEDURE — 99999 PR PBB SHADOW E&M-EST. PATIENT-LVL III: ICD-10-PCS | Mod: PBBFAC,,, | Performed by: PODIATRIST

## 2022-11-16 PROCEDURE — 3051F HG A1C>EQUAL 7.0%<8.0%: CPT | Mod: CPTII,S$GLB,, | Performed by: PODIATRIST

## 2022-11-16 PROCEDURE — 11721 PR DEBRIDEMENT OF NAILS, 6 OR MORE: ICD-10-PCS | Mod: 59,Q9,S$GLB, | Performed by: PODIATRIST

## 2022-11-16 NOTE — PROGRESS NOTES
Subjective:      Patient ID: Alana Snow is a 72 y.o. female.    Chief Complaint: Diabetic Foot Exam and Nail Care    Alana is a 72 y.o. female who presents to the clinic for evaluation and treatment of diabetic feet. Alana has a past medical history of AVM (arteriovenous malformation) of small bowel, acquired, Cataract, Colon polyp (01/11/2016), Coronary artery disease, Diabetes mellitus, type 2, Diverticulitis, DJD (degenerative joint disease) of knee, DJD (degenerative joint disease) of knee (3/31/2016), Drop foot gait, Hypertension, Internal hemorrhoid (01/11/2016), and Plantar fasciitis. Patient relates having toenails that are in need of trimming.  Denies being painful with wearing shoe gear.  Has not attempted to self trim.  Denies overt neuropathy pain with today's exam.  Notes being scheduled for upcoming EVLT of bilateral LE per Cardiology.  Denies bouts of venous blistering/stasis dermatitis.  Denies any additional pedal complaints.       PCP: Jessica Hartmann MD    Date Last Seen by PCP: 10/22    Hemoglobin A1C   Date Value Ref Range Status   10/25/2022 7.3 (H) 0.0 - 5.6 % Final     Comment:     Reference Interval:  5.0 - 5.6 Normal   5.7 - 6.4 High Risk   > 6.5 Diabetic      Hgb A1c results are standardized based on the (NGSP) National   Glycohemoglobin Standardization Program.      Hemoglobin A1C levels are related to mean serum/plasma glucose   during the preceding 2-3 months.        03/11/2022 7.4 (H) 0.0 - 5.6 % Final     Comment:     Reference Interval:  5.0 - 5.6 Normal   5.7 - 6.4 High Risk   > 6.5 Diabetic      Hgb A1c results are standardized based on the (NGSP) National   Glycohemoglobin Standardization Program.      Hemoglobin A1C levels are related to mean serum/plasma glucose   during the preceding 2-3 months.        10/02/2021 7.4 (H) 0.0 - 5.6 % Final     Comment:     Reference Interval:  5.0 - 5.6 Normal   5.7 - 6.4 High Risk   > 6.5 Diabetic      Hgb A1c results are  standardized based on the (NGSP) National   Glycohemoglobin Standardization Program.      Hemoglobin A1C levels are related to mean serum/plasma glucose   during the preceding 2-3 months.                Past Medical History:   Diagnosis Date    AVM (arteriovenous malformation) of small bowel, acquired     Cataract     Colon polyp 01/11/2016    Coronary artery disease     Diabetes mellitus, type 2     Diverticulitis     DJD (degenerative joint disease) of knee     left    DJD (degenerative joint disease) of knee 3/31/2016    Drop foot gait     right    Hypertension     Internal hemorrhoid 01/11/2016    Plantar fasciitis        Past Surgical History:   Procedure Laterality Date    ABDOMINAL SURGERY      Cholecystectomy    BREAST BIOPSY Right     excisional over 10 yrs. benign    BREAST CYST ASPIRATION      CARPAL TUNNEL RELEASE Left 6/26/10    per Dr. Ramirez    CARPAL TUNNEL RELEASE Right 8/14/10    per Dr. Ramirez    COLON SURGERY      sigmoidectomy    COLONOSCOPY N/A 1/11/2016    Procedure: COLONOSCOPY;  Surgeon: Blair López MD;  Location: [facility] ENDO;  Service: Endoscopy;  Laterality: N/A;    COLONOSCOPY N/A 3/24/2021    Procedure: COLONOSCOPY;  Surgeon: Blair López MD;  Location: [facility] ENDO;  Service: Endoscopy;  Laterality: N/A;    CORONARY STENT PLACEMENT      PILONIDAL CYST DRAINAGE      WRIST FRACTURE SURGERY Right 01/10/2018    clsd reduction w/application ex-fix       Family History   Problem Relation Age of Onset    Diabetes Mother     Cancer Father     Coronary artery disease Father     Breast cancer Paternal Grandmother         age unknown    Breast cancer Paternal Aunt         age unknown       Social History     Socioeconomic History    Marital status:    Tobacco Use    Smoking status: Never    Smokeless tobacco: Never   Substance and Sexual Activity    Alcohol use: No     Alcohol/week: 0.0 standard drinks    Drug use: No     Social Determinants of Health     Financial Resource Strain:  Medium Risk    Difficulty of Paying Living Expenses: Somewhat hard   Food Insecurity: Food Insecurity Present    Worried About Running Out of Food in the Last Year: Sometimes true    Ran Out of Food in the Last Year: Sometimes true   Transportation Needs: No Transportation Needs    Lack of Transportation (Medical): No    Lack of Transportation (Non-Medical): No   Physical Activity: Unknown    Days of Exercise per Week: 2 days   Stress: No Stress Concern Present    Feeling of Stress : Not at all   Social Connections: Unknown    Frequency of Communication with Friends and Family: More than three times a week    Frequency of Social Gatherings with Friends and Family: More than three times a week    Active Member of Clubs or Organizations: Yes    Attends Club or Organization Meetings: More than 4 times per year    Marital Status:    Housing Stability: Unknown    Unable to Pay for Housing in the Last Year: No    Unstable Housing in the Last Year: No       Current Outpatient Medications   Medication Sig Dispense Refill    alcohol swabs (BD ALCOHOL SWABS) PadM Apply 1 each topically 3 (three) times daily. 200 each 6    atorvastatin (LIPITOR) 40 MG tablet Take 1 tablet (40 mg total) by mouth once daily. 90 tablet 1    blood sugar diagnostic (TRUE METRIX GLUCOSE TEST STRIP) Strp Take glucose TID. 200 each 6    cholecalciferol, vitamin D3, (VITAMIN D3) 5,000 unit Tab Take 2 on MWF and 1 the other days      citalopram (CELEXA) 20 MG tablet TAKE 1 TABLET EVERY DAY 90 tablet 1    clopidogreL (PLAVIX) 75 mg tablet TAKE 1 TABLET (75 MG TOTAL) BY MOUTH ONCE DAILY. 90 tablet 1    glimepiride (AMARYL) 4 MG tablet TAKE 1 TABLET (4 MG TOTAL) BY MOUTH ONCE DAILY. 90 tablet 1    insulin (LANTUS SOLOSTAR U-100 INSULIN) glargine 100 units/mL (3mL) SubQ pen Inject 20 Units into the skin every evening. 18 mL 3    JANUVIA 100 mg Tab TAKE 1 TABLET (100 MG TOTAL) BY MOUTH ONCE DAILY. 90 tablet 1    lancets (TRUEPLUS LANCETS) 28 gauge  "Misc Take glucose TID. 200 each 6    lansoprazole (PREVACID) 30 MG capsule TAKE 1 CAPSULE EVERY DAY 90 capsule 1    METANX, ALGAL OIL, 3 mg-35 mg-2 mg -90.314 mg Cap Take 1 capsule by mouth 2 (two) times daily. 180 capsule 3    pen needle, diabetic (SURE COMFORT PEN NEEDLE) 32 gauge x 5/32" Ndle use as directed with lantus 200 each 6    coQ10, ubiquinol, 100 mg Cap Take 1 capsule by mouth once daily.       famotidine (PEPCID) 20 MG tablet Take 20 mg by mouth once daily.       No current facility-administered medications for this visit.       Review of patient's allergies indicates:   Allergen Reactions    Bacitracin Dermatitis     Tested positive for contact allergy to bacitracin (should also avoid neomycin, and polymixin if possible)--marino Obrien, 2015    Balsam peru Rash     Tested positive for contact allergy to Balsam of peru,used in flavorings and fragrances--had very pruritic, extensive rash but not clear which products provoked it.  marino Obrien, 2015    Epoxy resin Rash     Tested positive for contact allergy to epoxy resin--also to Carba mix, involved in rubber mfg--but couldn't pull that up     Ethylenediamine (bulk) Rash     Tested positive for contact allergy to ethylenediamine dihydrochloride, Marino Obrien, 2015--after prolonged pruritic rash---often present in fungicides, antibiotic creams, eye and nose drops.    Gold sodium thiosulfate Rash     Tested positive for contact allergy by marino Obrien, 2015--after having severe pruritic rashes    Latex, natural rubber Rash    Metformin Diarrhea     Really affected things    Sulfa (sulfonamide antibiotics) Other (See Comments)     Affects liver    Tixocortol pivalate Rash     --tested positive for contact allergy to tixocortol-21-pivalate, commonly found in topical anti-inflammatory products and related corticosteroids    Chromium and derivatives Rash    Gold salts     Losartan Rash         Review of Systems   Constitutional: " Negative for chills and fever.   Cardiovascular:  Positive for leg swelling. Negative for claudication.   Skin:  Positive for color change and nail changes.   Musculoskeletal:  Positive for joint swelling. Negative for joint pain, muscle cramps, muscle weakness and myalgias.   Neurological:  Positive for numbness. Negative for paresthesias.   Psychiatric/Behavioral:  Negative for altered mental status.          Objective:      Physical Exam  Constitutional:       General: She is not in acute distress.     Appearance: She is well-developed. She is not diaphoretic.   Cardiovascular:      Pulses:           Dorsalis pedis pulses are 2+ on the right side and 2+ on the left side.        Posterior tibial pulses are 2+ on the right side and 2+ on the left side.      Comments: CFT is < 3 seconds bilateral.  Pedal hair growth is decreased bilateral.  Varicosities noted bilateral.  Toes are cool to touch bilateral.    Musculoskeletal:         General: No tenderness.      Right lower le+ Edema present.      Left lower le+ Edema present.      Comments: Muscle strength 5/5 in all muscle groups bilateral.  No tenderness nor crepitation with ROM of foot/ankle joints bilateral.  Pain with palpation to lesions about bilateral 5th mtp joints.  Bilateral pes planus foot type.  Rectus alignment of all toes bilateral.     Skin:     General: Skin is warm and dry.      Capillary Refill: Capillary refill takes 2 to 3 seconds.      Coloration: Skin is not pale.      Findings: Lesion present. No abrasion, bruising, burn, ecchymosis, erythema, laceration, rash or wound.      Comments: Pedal skin appears edematous bilateral.  Toenails x 10 appear thickened by 2 mm, elongated by 4 mm, and discolored with subungual debris.  Hyperkeratotic lesion noted to the lateral aspect of bilateral 5th mtp joint.     Neurological:      Mental Status: She is alert and oriented to person, place, and time.      Sensory: Sensory deficit present.       Motor: Motor function is intact. No weakness or atrophy.      Comments: Protective sensation per [place]-Justyna monofilament is intact bilateral.  Vibratory sensation is decreased bilateral.  Light touch is intact bilateral.             Assessment:       Encounter Diagnoses   Name Primary?    Diabetic polyneuropathy associated with type 2 diabetes mellitus Yes    Venous insufficiency     Peripheral edema     Onychomycosis due to dermatophyte     Morbid obesity with BMI of 40.0-44.9, adult     Corn or callus          Plan:       Alana was seen today for diabetic foot exam and nail care.    Diagnoses and all orders for this visit:    Diabetic polyneuropathy associated with type 2 diabetes mellitus    Venous insufficiency    Peripheral edema    Onychomycosis due to dermatophyte    Morbid obesity with BMI of 40.0-44.9, adult    Corn or callus    I counseled the patient on her conditions, their implications and medical management.    Patient scheduled for three rounds of EVLT at the end of this month and the first of December.  To continue checking the skin for signs of stasis dermatitis and venous blistering.      Discussed the importance of diet and exercise as they pertain to diabetes management and maintaining a healthy BMI.    Shoe inspection. Diabetic Foot Education. Patient reminded of the importance of good nutrition and blood sugar control to help prevent podiatric complications of diabetes. Patient instructed on proper foot hygeine. We discussed wearing proper shoe gear, daily foot inspections, never walking without protective shoe gear, never putting sharp instruments to feet    With patient's permission, nails were aggressively reduced and debrided x 10 to their soft tissue attachment mechanically and with electric , removing all offending nail and debris.  Also, a sterile #15 scalpel was used to trim lesions x 2 down to smooth appearing skin without incident.  Patient relates relief following the  procedure. She will continue to monitor the areas daily, inspect her feet, wear protective shoe gear when ambulatory, moisturizer to maintain skin integrity and follow in this office in approximately 3 months, sooner p.r.n.    Follow up in about 3 months (around 2/16/2023).    Sandoval Pierre DPM

## 2023-03-07 ENCOUNTER — OFFICE VISIT (OUTPATIENT)
Dept: PODIATRY | Facility: CLINIC | Age: 73
End: 2023-03-07
Payer: MEDICARE

## 2023-03-07 DIAGNOSIS — E11.42 DIABETIC POLYNEUROPATHY ASSOCIATED WITH TYPE 2 DIABETES MELLITUS: Primary | ICD-10-CM

## 2023-03-07 DIAGNOSIS — B35.1 ONYCHOMYCOSIS DUE TO DERMATOPHYTE: ICD-10-CM

## 2023-03-07 DIAGNOSIS — R60.0 PERIPHERAL EDEMA: ICD-10-CM

## 2023-03-07 DIAGNOSIS — I87.2 VENOUS INSUFFICIENCY: ICD-10-CM

## 2023-03-07 PROCEDURE — 3044F PR MOST RECENT HEMOGLOBIN A1C LEVEL <7.0%: ICD-10-PCS | Mod: CPTII,S$GLB,, | Performed by: PODIATRIST

## 2023-03-07 PROCEDURE — 11721 PR DEBRIDEMENT OF NAILS, 6 OR MORE: ICD-10-PCS | Mod: Q9,S$GLB,, | Performed by: PODIATRIST

## 2023-03-07 PROCEDURE — 99499 UNLISTED E&M SERVICE: CPT | Mod: S$GLB,,, | Performed by: PODIATRIST

## 2023-03-07 PROCEDURE — 3044F HG A1C LEVEL LT 7.0%: CPT | Mod: CPTII,S$GLB,, | Performed by: PODIATRIST

## 2023-03-07 PROCEDURE — 99499 NO LOS: ICD-10-PCS | Mod: S$GLB,,, | Performed by: PODIATRIST

## 2023-03-07 PROCEDURE — 11721 DEBRIDE NAIL 6 OR MORE: CPT | Mod: Q9,S$GLB,, | Performed by: PODIATRIST

## 2023-03-07 NOTE — PROGRESS NOTES
Subjective:      Patient ID: Alana Snow is a 72 y.o. female.    Chief Complaint: No chief complaint on file.      Alana is a 72 y.o. female who presents to the clinic for evaluation and treatment of diabetic feet. Alana has a past medical history of AVM (arteriovenous malformation) of small bowel, acquired, Cataract, Colon polyp (01/11/2016), Coronary artery disease, Diabetes mellitus, type 2, Diverticulitis, DJD (degenerative joint disease) of knee, DJD (degenerative joint disease) of knee (3/31/2016), Drop foot gait, Hypertension, Internal hemorrhoid (01/11/2016), and Plantar fasciitis. Patient relates having toenails that are in need of trimming.  Denies being painful with wearing shoe gear.  Has not attempted to self trim.  Denies overt neuropathy pain with today's exam.  Relates doing well with the past EVLT of bilateral lower extremities, however, notes continued edema in the limbs.  She has no scheduled follow up with Vascular.  Denies recent bouts of venous blistering/stasis dermatitis.  Denies any additional pedal complaints.       PCP: Jessica Hartmann MD    Date Last Seen by PCP: 11/22    Hemoglobin A1C   Date Value Ref Range Status   02/28/2023 6.8 (H) 0.0 - 5.6 % Final     Comment:     Reference Interval:  5.0 - 5.6 Normal   5.7 - 6.4 High Risk   > 6.5 Diabetic      Hgb A1c results are standardized based on the (NGSP) National   Glycohemoglobin Standardization Program.      Hemoglobin A1C levels are related to mean serum/plasma glucose   during the preceding 2-3 months.        10/25/2022 7.3 (H) 0.0 - 5.6 % Final     Comment:     Reference Interval:  5.0 - 5.6 Normal   5.7 - 6.4 High Risk   > 6.5 Diabetic      Hgb A1c results are standardized based on the (NGSP) National   Glycohemoglobin Standardization Program.      Hemoglobin A1C levels are related to mean serum/plasma glucose   during the preceding 2-3 months.        03/11/2022 7.4 (H) 0.0 - 5.6 % Final     Comment:     Reference  Interval:  5.0 - 5.6 Normal   5.7 - 6.4 High Risk   > 6.5 Diabetic      Hgb A1c results are standardized based on the (NGSP) National   Glycohemoglobin Standardization Program.      Hemoglobin A1C levels are related to mean serum/plasma glucose   during the preceding 2-3 months.                Past Medical History:   Diagnosis Date    AVM (arteriovenous malformation) of small bowel, acquired     Cataract     Colon polyp 01/11/2016    Coronary artery disease     Diabetes mellitus, type 2     Diverticulitis     DJD (degenerative joint disease) of knee     left    DJD (degenerative joint disease) of knee 3/31/2016    Drop foot gait     right    Hypertension     Internal hemorrhoid 01/11/2016    Plantar fasciitis        Past Surgical History:   Procedure Laterality Date    ABDOMINAL SURGERY      Cholecystectomy    BREAST BIOPSY Right     excisional over 10 yrs. benign    BREAST CYST ASPIRATION      CARPAL TUNNEL RELEASE Left 6/26/10    per Dr. Ramirez    CARPAL TUNNEL RELEASE Right 8/14/10    per Dr. Ramirez    COLON SURGERY      sigmoidectomy    COLONOSCOPY N/A 1/11/2016    Procedure: COLONOSCOPY;  Surgeon: Blair López MD;  Location: [facility] ENDO;  Service: Endoscopy;  Laterality: N/A;    COLONOSCOPY N/A 3/24/2021    Procedure: COLONOSCOPY;  Surgeon: Blair López MD;  Location: [facility] ENDO;  Service: Endoscopy;  Laterality: N/A;    CORONARY STENT PLACEMENT      PILONIDAL CYST DRAINAGE      WRIST FRACTURE SURGERY Right 01/10/2018    clsd reduction w/application ex-fix       Family History   Problem Relation Age of Onset    Diabetes Mother     Cancer Father     Coronary artery disease Father     Breast cancer Paternal Grandmother         age unknown    Breast cancer Paternal Aunt         age unknown       Social History     Socioeconomic History    Marital status:    Tobacco Use    Smoking status: Never    Smokeless tobacco: Never   Substance and Sexual Activity    Alcohol use: No     Alcohol/week: 0.0  standard drinks    Drug use: No     Social Determinants of Health     Financial Resource Strain: Medium Risk    Difficulty of Paying Living Expenses: Somewhat hard   Food Insecurity: Food Insecurity Present    Worried About Running Out of Food in the Last Year: Sometimes true    Ran Out of Food in the Last Year: Sometimes true   Transportation Needs: No Transportation Needs    Lack of Transportation (Medical): No    Lack of Transportation (Non-Medical): No   Physical Activity: Insufficiently Active    Days of Exercise per Week: 1 day    Minutes of Exercise per Session: 10 min   Stress: No Stress Concern Present    Feeling of Stress : Not at all   Social Connections: Unknown    Frequency of Communication with Friends and Family: More than three times a week    Frequency of Social Gatherings with Friends and Family: Three times a week    Active Member of Clubs or Organizations: Yes    Attends Club or Organization Meetings: More than 4 times per year    Marital Status:    Housing Stability: Unknown    Unable to Pay for Housing in the Last Year: No    Unstable Housing in the Last Year: No       Current Outpatient Medications   Medication Sig Dispense Refill    alcohol swabs (BD ALCOHOL SWABS) PadM Apply 1 each topically 3 (three) times daily. 200 each 6    aspirin 81 mg Cap       atorvastatin (LIPITOR) 40 MG tablet Take 1 tablet (40 mg total) by mouth once daily. 90 tablet 1    blood sugar diagnostic (TRUE METRIX GLUCOSE TEST STRIP) Strp CHECK BLOOD SUGAR THREE TIMES DAILY 300 strip 6    cholecalciferol, vitamin D3, (VITAMIN D3) 5,000 unit Tab Take 2 on MWF and 1 the other days      citalopram (CELEXA) 20 MG tablet Take 1 tablet (20 mg total) by mouth once daily. 90 tablet 1    clopidogreL (PLAVIX) 75 mg tablet TAKE 1 TABLET (75 MG TOTAL) BY MOUTH ONCE DAILY. 90 tablet 1    glimepiride (AMARYL) 4 MG tablet TAKE 1 TABLET (4 MG TOTAL) BY MOUTH ONCE DAILY. 90 tablet 1    insulin (LANTUS SOLOSTAR U-100 INSULIN)  "glargine 100 units/mL SubQ pen INJECT 20 UNITS INTO THE SKIN EVERY EVENING. 30 mL 3    lancets (TRUEPLUS LANCETS) 28 gauge Misc Take glucose TID. 200 each 6    lansoprazole (PREVACID) 30 MG capsule TAKE 1 CAPSULE EVERY DAY 90 capsule 1    METANX, ALGAL OIL, 3 mg-35 mg-2 mg -90.314 mg Cap Take 1 capsule by mouth 2 (two) times daily. 180 capsule 3    pen needle, diabetic (SURE COMFORT PEN NEEDLE) 32 gauge x 5/32" Ndle use as directed with lantus 200 each 6    semaglutide (OZEMPIC) 0.25 mg or 0.5 mg(2 mg/1.5 mL) pen injector Inject 0.25 mg into the skin every 7 days. 2 pen 2     No current facility-administered medications for this visit.       Review of patient's allergies indicates:   Allergen Reactions    Bacitracin Dermatitis     Tested positive for contact allergy to bacitracin (should also avoid neomycin, and polymixin if possible)--marino Obrien, 2015    Balsam peru Rash     Tested positive for contact allergy to Balsam of peru,used in flavorings and fragrances--had very pruritic, extensive rash but not clear which products provoked it.  marino Obrien, 2015    Epoxy resin Rash     Tested positive for contact allergy to epoxy resin--also to Carba mix, involved in rubber mfg--but couldn't pull that up     Ethylenediamine (bulk) Rash     Tested positive for contact allergy to ethylenediamine dihydrochloride, Marion Obrien, 2015--after prolonged pruritic rash---often present in fungicides, antibiotic creams, eye and nose drops.    Gold sodium thiosulfate Rash     Tested positive for contact allergy by marino Obrien, 2015--after having severe pruritic rashes    Latex, natural rubber Rash    Metformin Diarrhea     Really affected things    Sulfa (sulfonamide antibiotics) Other (See Comments)     Affects liver    Tixocortol pivalate Rash     --tested positive for contact allergy to tixocortol-21-pivalate, commonly found in topical anti-inflammatory products and related corticosteroids    Chromium " and derivatives Rash    Gold salts     Losartan Rash         Review of Systems   Constitutional: Negative for chills and fever.   Cardiovascular:  Positive for leg swelling. Negative for claudication.   Skin:  Positive for color change and nail changes.   Musculoskeletal:  Positive for joint swelling. Negative for joint pain, muscle cramps, muscle weakness and myalgias.   Neurological:  Positive for numbness. Negative for paresthesias.   Psychiatric/Behavioral:  Negative for altered mental status.          Objective:      Physical Exam  Constitutional:       General: She is not in acute distress.     Appearance: She is well-developed. She is not diaphoretic.   Cardiovascular:      Pulses:           Dorsalis pedis pulses are 2+ on the right side and 2+ on the left side.        Posterior tibial pulses are 2+ on the right side and 2+ on the left side.      Comments: CFT is < 3 seconds bilateral.  Pedal hair growth is decreased bilateral.  Varicosities noted bilateral.  Toes are cool to touch bilateral.    Musculoskeletal:         General: No tenderness.      Right lower le+ Edema present.      Left lower le+ Edema present.      Comments: Muscle strength 5/5 in all muscle groups bilateral.  No tenderness nor crepitation with ROM of foot/ankle joints bilateral.  Bilateral pes planus foot type.  Rectus alignment of all toes bilateral.     Skin:     General: Skin is warm and dry.      Capillary Refill: Capillary refill takes 2 to 3 seconds.      Coloration: Skin is not pale.      Findings: No abrasion, bruising, burn, ecchymosis, erythema, laceration, lesion, rash or wound.      Comments: Pedal skin appears edematous bilateral.  Toenails x 10 appear thickened by 2 mm, elongated by 4 mm, and discolored with subungual debris.     Neurological:      Mental Status: She is alert and oriented to person, place, and time.      Sensory: Sensory deficit present.      Motor: Motor function is intact. No weakness or atrophy.       Comments: Protective sensation per [place]-Justyna monofilament is intact bilateral.  Vibratory sensation is decreased bilateral.  Light touch is intact bilateral.             Assessment:       Encounter Diagnoses   Name Primary?    Diabetic polyneuropathy associated with type 2 diabetes mellitus Yes    Venous insufficiency     Peripheral edema     Onychomycosis due to dermatophyte          Plan:       Diagnoses and all orders for this visit:    Diabetic polyneuropathy associated with type 2 diabetes mellitus    Venous insufficiency    Peripheral edema    Onychomycosis due to dermatophyte    I counseled the patient on her conditions, their implications and medical management.    Advised to follow up with Vascular in regards to continued venous insufficiency.     Recommend continued use of compression stockings and elevation of the limbs while at rest.      Shoe inspection. Diabetic Foot Education. Patient reminded of the importance of good nutrition and blood sugar control to help prevent podiatric complications of diabetes. Patient instructed on proper foot hygeine. We discussed wearing proper shoe gear, daily foot inspections, never walking without protective shoe gear, never putting sharp instruments to feet    With patient's permission, nails were aggressively reduced and debrided x 10 to their soft tissue attachment mechanically and with electric , removing all offending nail and debris.  Patient relates relief following the procedure. She will continue to monitor the areas daily, inspect her feet, wear protective shoe gear when ambulatory, moisturizer to maintain skin integrity and follow in this office in approximately 3 months, sooner p.r.n.    Follow up in about 3 months (around 6/7/2023).    Sandoval Pierre DPM

## 2023-06-13 ENCOUNTER — OFFICE VISIT (OUTPATIENT)
Dept: PODIATRY | Facility: CLINIC | Age: 73
End: 2023-06-13
Payer: MEDICARE

## 2023-06-13 DIAGNOSIS — B35.1 ONYCHOMYCOSIS DUE TO DERMATOPHYTE: ICD-10-CM

## 2023-06-13 DIAGNOSIS — R60.0 PERIPHERAL EDEMA: ICD-10-CM

## 2023-06-13 DIAGNOSIS — E11.42 DIABETIC POLYNEUROPATHY ASSOCIATED WITH TYPE 2 DIABETES MELLITUS: Primary | ICD-10-CM

## 2023-06-13 DIAGNOSIS — E66.01 MORBID OBESITY WITH BMI OF 45.0-49.9, ADULT: ICD-10-CM

## 2023-06-13 DIAGNOSIS — I87.2 VENOUS INSUFFICIENCY: ICD-10-CM

## 2023-06-13 PROCEDURE — 99999 PR PBB SHADOW E&M-EST. PATIENT-LVL III: CPT | Mod: PBBFAC,,, | Performed by: PODIATRIST

## 2023-06-13 PROCEDURE — 11721 DEBRIDE NAIL 6 OR MORE: CPT | Mod: Q9,S$GLB,, | Performed by: PODIATRIST

## 2023-06-13 PROCEDURE — 11721 PR DEBRIDEMENT OF NAILS, 6 OR MORE: ICD-10-PCS | Mod: Q9,S$GLB,, | Performed by: PODIATRIST

## 2023-06-13 PROCEDURE — 99999 PR PBB SHADOW E&M-EST. PATIENT-LVL III: ICD-10-PCS | Mod: PBBFAC,,, | Performed by: PODIATRIST

## 2023-06-13 PROCEDURE — 99499 NO LOS: ICD-10-PCS | Mod: S$GLB,,, | Performed by: PODIATRIST

## 2023-06-13 PROCEDURE — 99499 UNLISTED E&M SERVICE: CPT | Mod: S$GLB,,, | Performed by: PODIATRIST

## 2023-06-13 NOTE — PROGRESS NOTES
Subjective:      Patient ID: Alana Snow is a 73 y.o. female.    Chief Complaint: No chief complaint on file.      Alana is a 73 y.o. female who presents to the clinic for evaluation and treatment of diabetic feet. Alana has a past medical history of AVM (arteriovenous malformation) of small bowel, acquired, Cataract, Colon polyp (01/11/2016), Coronary artery disease, Diabetes mellitus, type 2, Diverticulitis, DJD (degenerative joint disease) of knee, DJD (degenerative joint disease) of knee (3/31/2016), Drop foot gait, Hypertension, Internal hemorrhoid (01/11/2016), and Plantar fasciitis. Patient relates having toenails that are in need of trimming.  Denies being painful with wearing shoe gear.  Has not attempted to self trim.  Denies overt neuropathy pain with today's exam.  Notes an increase in swelling of the lower extremities.  Has not been donning compression stockings to mitigate said symptoms.  Denies recent bouts of venous blistering/stasis dermatitis.  Denies any additional pedal complaints.       PCP: Jessica Hartmann MD    Date Last Seen by PCP: 4/23    Hemoglobin A1C   Date Value Ref Range Status   02/28/2023 6.8 (H) 0.0 - 5.6 % Final     Comment:     Reference Interval:  5.0 - 5.6 Normal   5.7 - 6.4 High Risk   > 6.5 Diabetic      Hgb A1c results are standardized based on the (NGSP) National   Glycohemoglobin Standardization Program.      Hemoglobin A1C levels are related to mean serum/plasma glucose   during the preceding 2-3 months.        10/25/2022 7.3 (H) 0.0 - 5.6 % Final     Comment:     Reference Interval:  5.0 - 5.6 Normal   5.7 - 6.4 High Risk   > 6.5 Diabetic      Hgb A1c results are standardized based on the (NGSP) National   Glycohemoglobin Standardization Program.      Hemoglobin A1C levels are related to mean serum/plasma glucose   during the preceding 2-3 months.        03/11/2022 7.4 (H) 0.0 - 5.6 % Final     Comment:     Reference Interval:  5.0 - 5.6 Normal   5.7 -  6.4 High Risk   > 6.5 Diabetic      Hgb A1c results are standardized based on the (NGSP) National   Glycohemoglobin Standardization Program.      Hemoglobin A1C levels are related to mean serum/plasma glucose   during the preceding 2-3 months.                Past Medical History:   Diagnosis Date    AVM (arteriovenous malformation) of small bowel, acquired     Cataract     Colon polyp 01/11/2016    Coronary artery disease     Diabetes mellitus, type 2     Diverticulitis     DJD (degenerative joint disease) of knee     left    DJD (degenerative joint disease) of knee 3/31/2016    Drop foot gait     right    Hypertension     Internal hemorrhoid 01/11/2016    Plantar fasciitis        Past Surgical History:   Procedure Laterality Date    ABDOMINAL SURGERY      Cholecystectomy    BREAST BIOPSY Right     excisional over 10 yrs. benign    BREAST CYST ASPIRATION      CARPAL TUNNEL RELEASE Left 6/26/10    per Dr. Ramirez    CARPAL TUNNEL RELEASE Right 8/14/10    per Dr. Ramirez    COLON SURGERY      sigmoidectomy    COLONOSCOPY N/A 1/11/2016    Procedure: COLONOSCOPY;  Surgeon: Blair López MD;  Location: [facility] ENDO;  Service: Endoscopy;  Laterality: N/A;    COLONOSCOPY N/A 3/24/2021    Procedure: COLONOSCOPY;  Surgeon: Blair López MD;  Location: [facility] ENDO;  Service: Endoscopy;  Laterality: N/A;    CORONARY STENT PLACEMENT      PILONIDAL CYST DRAINAGE      WRIST FRACTURE SURGERY Right 01/10/2018    clsd reduction w/application ex-fix       Family History   Problem Relation Age of Onset    Diabetes Mother     Cancer Father     Coronary artery disease Father     Breast cancer Paternal Grandmother         age unknown    Breast cancer Paternal Aunt         age unknown       Social History     Socioeconomic History    Marital status:    Tobacco Use    Smoking status: Never    Smokeless tobacco: Never   Substance and Sexual Activity    Alcohol use: No     Alcohol/week: 0.0 standard drinks    Drug use: No      Social Determinants of Health     Financial Resource Strain: Medium Risk    Difficulty of Paying Living Expenses: Somewhat hard   Food Insecurity: Food Insecurity Present    Worried About Running Out of Food in the Last Year: Sometimes true    Ran Out of Food in the Last Year: Patient refused   Transportation Needs: No Transportation Needs    Lack of Transportation (Medical): No    Lack of Transportation (Non-Medical): No   Physical Activity: Unknown    Days of Exercise per Week: Patient refused    Minutes of Exercise per Session: 10 min   Stress: Stress Concern Present    Feeling of Stress : To some extent   Social Connections: Unknown    Frequency of Communication with Friends and Family: More than three times a week    Frequency of Social Gatherings with Friends and Family: More than three times a week    Active Member of Clubs or Organizations: Patient refused    Attends Club or Organization Meetings: More than 4 times per year    Marital Status:    Housing Stability: Unknown    Unable to Pay for Housing in the Last Year: No    Unstable Housing in the Last Year: No       Current Outpatient Medications   Medication Sig Dispense Refill    alcohol swabs (BD ALCOHOL SWABS) PadM Apply 1 each topically 3 (three) times daily. 200 each 6    aspirin 81 mg Cap       atorvastatin (LIPITOR) 40 MG tablet TAKE 1 TABLET (40 MG TOTAL) BY MOUTH ONCE DAILY. 90 tablet 1    blood sugar diagnostic (TRUE METRIX GLUCOSE TEST STRIP) Strp CHECK BLOOD SUGAR THREE TIMES DAILY 300 strip 6    cabergoline (DOSTINEX) 0.5 mg tablet Take by mouth. Take half of a table twice weekly.      citalopram (CELEXA) 20 MG tablet Take 1 tablet (20 mg total) by mouth once daily. 90 tablet 1    clopidogreL (PLAVIX) 75 mg tablet TAKE 1 TABLET (75 MG TOTAL) BY MOUTH ONCE DAILY. 90 tablet 1    glimepiride (AMARYL) 4 MG tablet TAKE 1 TABLET (4 MG TOTAL) BY MOUTH ONCE DAILY. 90 tablet 1    insulin (LANTUS SOLOSTAR U-100 INSULIN) glargine 100 units/mL  "SubQ pen INJECT 20 UNITS INTO THE SKIN EVERY EVENING. 30 mL 3    lancets (TRUEPLUS LANCETS) 28 gauge Misc Take glucose TID. 200 each 6    lansoprazole (PREVACID) 30 MG capsule TAKE 1 CAPSULE EVERY DAY 90 capsule 1    LEVOXYL 50 mcg tablet Take 50 mcg by mouth before breakfast.      METANX, ALGAL OIL, 3 mg-35 mg-2 mg -90.314 mg Cap Take 1 capsule by mouth 2 (two) times daily. 180 capsule 3    MOUNJARO 5 mg/0.5 mL PnIj Inject 0.5 mg into the skin once a week.      pen needle, diabetic (SURE COMFORT PEN NEEDLE) 32 gauge x 5/32" Ndle use as directed with lantus 200 each 6     No current facility-administered medications for this visit.       Review of patient's allergies indicates:   Allergen Reactions    Bacitracin Dermatitis     Tested positive for contact allergy to bacitracin (should also avoid neomycin, and polymixin if possible)--marino Obrien, 2015    Balsam peru Rash     Tested positive for contact allergy to Balsam of peru,used in flavorings and fragrances--had very pruritic, extensive rash but not clear which products provoked it.  marino Obrien, 2015    Epoxy resin Rash     Tested positive for contact allergy to epoxy resin--also to Carba mix, involved in rubber mfg--but couldn't pull that up     Ethylenediamine (bulk) Rash     Tested positive for contact allergy to ethylenediamine dihydrochloride, Marino Obrien, 2015--after prolonged pruritic rash---often present in fungicides, antibiotic creams, eye and nose drops.    Gold sodium thiosulfate Rash     Tested positive for contact allergy by marino Obrien, 2015--after having severe pruritic rashes    Latex, natural rubber Rash    Metformin Diarrhea     Really affected things    Sulfa (sulfonamide antibiotics) Other (See Comments)     Affects liver    Tixocortol pivalate Rash     --tested positive for contact allergy to tixocortol-21-pivalate, commonly found in topical anti-inflammatory products and related corticosteroids    Chromium and " derivatives Rash    Gold salts     Losartan Rash         Review of Systems   Constitutional: Negative for chills and fever.   Cardiovascular:  Positive for leg swelling. Negative for claudication.   Skin:  Positive for color change and nail changes.   Musculoskeletal:  Positive for joint swelling. Negative for joint pain, muscle cramps, muscle weakness and myalgias.   Neurological:  Positive for numbness. Negative for paresthesias.   Psychiatric/Behavioral:  Negative for altered mental status.          Objective:      Physical Exam  Constitutional:       General: She is not in acute distress.     Appearance: She is well-developed. She is not diaphoretic.   Cardiovascular:      Pulses:           Dorsalis pedis pulses are 2+ on the right side and 2+ on the left side.        Posterior tibial pulses are 2+ on the right side and 2+ on the left side.      Comments: CFT is < 3 seconds bilateral.  Pedal hair growth is decreased bilateral.  Varicosities noted bilateral.  Toes are cool to touch bilateral.    Musculoskeletal:         General: No tenderness.      Right lower le+ Edema present.      Left lower le+ Edema present.      Comments: Muscle strength 5/5 in all muscle groups bilateral.  No tenderness nor crepitation with ROM of foot/ankle joints bilateral.  Bilateral pes planus foot type.  Rectus alignment of all toes bilateral.     Skin:     General: Skin is warm and dry.      Capillary Refill: Capillary refill takes 2 to 3 seconds.      Coloration: Skin is not pale.      Findings: No abrasion, bruising, burn, ecchymosis, erythema, laceration, lesion, rash or wound.      Comments: Pedal skin appears edematous bilateral.  Toenails x 10 appear thickened by 2 mm, elongated by 4 mm, and discolored with subungual debris.     Neurological:      Mental Status: She is alert and oriented to person, place, and time.      Sensory: Sensory deficit present.      Motor: Motor function is intact. No weakness or atrophy.       Comments: Protective sensation per [place]-Justyna monofilament is intact bilateral.  Vibratory sensation is decreased bilateral.  Light touch is intact bilateral.             Assessment:       Encounter Diagnoses   Name Primary?    Diabetic polyneuropathy associated with type 2 diabetes mellitus Yes    Venous insufficiency     Peripheral edema     Morbid obesity with BMI of 45.0-49.9, adult     Onychomycosis due to dermatophyte          Plan:       Diagnoses and all orders for this visit:    Diabetic polyneuropathy associated with type 2 diabetes mellitus    Venous insufficiency    Peripheral edema    Morbid obesity with BMI of 45.0-49.9, adult    Onychomycosis due to dermatophyte      I counseled the patient on her conditions, their implications and medical management.    Recommend she resume use of compression stockings and elevation of the limbs while at rest.      Shoe inspection. Diabetic Foot Education. Patient reminded of the importance of good nutrition and blood sugar control to help prevent podiatric complications of diabetes. Patient instructed on proper foot hygeine. We discussed wearing proper shoe gear, daily foot inspections, never walking without protective shoe gear, never putting sharp instruments to feet    With patient's permission, nails were aggressively reduced and debrided x 10 to their soft tissue attachment mechanically and with electric , removing all offending nail and debris.  Patient relates relief following the procedure. She will continue to monitor the areas daily, inspect her feet, wear protective shoe gear when ambulatory, moisturizer to maintain skin integrity and follow in this office in approximately 3 months, sooner p.r.n.    Follow up in about 3 months (around 9/13/2023).    Sandoval Pierre DPM

## 2023-09-29 ENCOUNTER — OFFICE VISIT (OUTPATIENT)
Dept: PODIATRY | Facility: CLINIC | Age: 73
End: 2023-09-29
Payer: MEDICARE

## 2023-09-29 VITALS — WEIGHT: 254 LBS | BODY MASS INDEX: 42.32 KG/M2 | HEIGHT: 65 IN

## 2023-09-29 DIAGNOSIS — E11.42 DIABETIC POLYNEUROPATHY ASSOCIATED WITH TYPE 2 DIABETES MELLITUS: Primary | ICD-10-CM

## 2023-09-29 DIAGNOSIS — B35.1 ONYCHOMYCOSIS DUE TO DERMATOPHYTE: ICD-10-CM

## 2023-09-29 DIAGNOSIS — E66.01 MORBID OBESITY WITH BMI OF 40.0-44.9, ADULT: ICD-10-CM

## 2023-09-29 DIAGNOSIS — I87.2 VENOUS INSUFFICIENCY: ICD-10-CM

## 2023-09-29 DIAGNOSIS — R60.0 PERIPHERAL EDEMA: ICD-10-CM

## 2023-09-29 PROCEDURE — 11721 PR DEBRIDEMENT OF NAILS, 6 OR MORE: ICD-10-PCS | Mod: Q9,S$GLB,, | Performed by: PODIATRIST

## 2023-09-29 PROCEDURE — 99999 PR PBB SHADOW E&M-EST. PATIENT-LVL II: CPT | Mod: PBBFAC,,, | Performed by: PODIATRIST

## 2023-09-29 PROCEDURE — 99499 NO LOS: ICD-10-PCS | Mod: S$GLB,,, | Performed by: PODIATRIST

## 2023-09-29 PROCEDURE — 99999 PR PBB SHADOW E&M-EST. PATIENT-LVL II: ICD-10-PCS | Mod: PBBFAC,,, | Performed by: PODIATRIST

## 2023-09-29 PROCEDURE — 99499 UNLISTED E&M SERVICE: CPT | Mod: S$GLB,,, | Performed by: PODIATRIST

## 2023-09-29 PROCEDURE — 11721 DEBRIDE NAIL 6 OR MORE: CPT | Mod: Q9,S$GLB,, | Performed by: PODIATRIST

## 2023-09-29 NOTE — PROGRESS NOTES
Subjective:      Patient ID: Alana Snow is a 73 y.o. female.    Chief Complaint: Nail Care      Alana is a 73 y.o. female who presents to the clinic for evaluation and treatment of diabetic feet. Alana has a past medical history of AVM (arteriovenous malformation) of small bowel, acquired, Cataract, Colon polyp (01/11/2016), Coronary artery disease, Diabetes mellitus, type 2, Diverticulitis, DJD (degenerative joint disease) of knee, DJD (degenerative joint disease) of knee (3/31/2016), Drop foot gait, Hypertension, Internal hemorrhoid (01/11/2016), and Plantar fasciitis. Patient relates having toenails that are in need of trimming.  Denies being painful with wearing shoe gear.  Has not attempted to self trim.  Denies overt neuropathy pain with today's exam.  She continues with lower extremity swelling.  Denies recent bouts of venous blistering/stasis dermatitis.  Denies any additional pedal complaints.       PCP: Jessica Hartmann MD    Date Last Seen by PCP: 8/23    Hemoglobin A1C   Date Value Ref Range Status   08/07/2023 5.5 0.0 - 5.6 % Final     Comment:     Reference Interval:  5.0 - 5.6 Normal   5.7 - 6.4 High Risk   > 6.5 Diabetic      Hgb A1c results are standardized based on the (NGSP) National   Glycohemoglobin Standardization Program.      Hemoglobin A1C levels are related to mean serum/plasma glucose   during the preceding 2-3 months.        02/28/2023 6.8 (H) 0.0 - 5.6 % Final     Comment:     Reference Interval:  5.0 - 5.6 Normal   5.7 - 6.4 High Risk   > 6.5 Diabetic      Hgb A1c results are standardized based on the (NGSP) National   Glycohemoglobin Standardization Program.      Hemoglobin A1C levels are related to mean serum/plasma glucose   during the preceding 2-3 months.        10/25/2022 7.3 (H) 0.0 - 5.6 % Final     Comment:     Reference Interval:  5.0 - 5.6 Normal   5.7 - 6.4 High Risk   > 6.5 Diabetic      Hgb A1c results are standardized based on the (NGSP) National    Glycohemoglobin Standardization Program.      Hemoglobin A1C levels are related to mean serum/plasma glucose   during the preceding 2-3 months.                Past Medical History:   Diagnosis Date    AVM (arteriovenous malformation) of small bowel, acquired     Cataract     Colon polyp 01/11/2016    Coronary artery disease     Diabetes mellitus, type 2     Diverticulitis     DJD (degenerative joint disease) of knee     left    DJD (degenerative joint disease) of knee 3/31/2016    Drop foot gait     right    Hypertension     Internal hemorrhoid 01/11/2016    Plantar fasciitis        Past Surgical History:   Procedure Laterality Date    ABDOMINAL SURGERY      Cholecystectomy    BREAST BIOPSY Right     excisional over 10 yrs. benign    BREAST CYST ASPIRATION      CARPAL TUNNEL RELEASE Left 6/26/10    per Dr. Ramirez    CARPAL TUNNEL RELEASE Right 8/14/10    per Dr. Ramirez    COLON SURGERY      sigmoidectomy    COLONOSCOPY N/A 1/11/2016    Procedure: COLONOSCOPY;  Surgeon: Blair López MD;  Location: [facility] ENDO;  Service: Endoscopy;  Laterality: N/A;    COLONOSCOPY N/A 3/24/2021    Procedure: COLONOSCOPY;  Surgeon: Blair López MD;  Location: [facility] ENDO;  Service: Endoscopy;  Laterality: N/A;    CORONARY STENT PLACEMENT      PILONIDAL CYST DRAINAGE      WRIST FRACTURE SURGERY Right 01/10/2018    clsd reduction w/application ex-fix       Family History   Problem Relation Age of Onset    Diabetes Mother     Cancer Father     Coronary artery disease Father     Breast cancer Paternal Grandmother         age unknown    Breast cancer Paternal Aunt         age unknown       Social History     Socioeconomic History    Marital status:    Tobacco Use    Smoking status: Never    Smokeless tobacco: Never   Substance and Sexual Activity    Alcohol use: No     Alcohol/week: 0.0 standard drinks of alcohol    Drug use: No     Social Determinants of Health     Financial Resource Strain: Medium Risk (7/24/2023)     Overall Financial Resource Strain (CARDIA)     Difficulty of Paying Living Expenses: Somewhat hard   Food Insecurity: Food Insecurity Present (7/24/2023)    Hunger Vital Sign     Worried About Running Out of Food in the Last Year: Sometimes true     Ran Out of Food in the Last Year: Sometimes true   Transportation Needs: No Transportation Needs (7/24/2023)    PRAPARE - Transportation     Lack of Transportation (Medical): No     Lack of Transportation (Non-Medical): No   Physical Activity: Sufficiently Active (7/24/2023)    Exercise Vital Sign     Days of Exercise per Week: 5 days     Minutes of Exercise per Session: 30 min   Stress: Stress Concern Present (7/24/2023)    [employer] - Occupational Stress Questionnaire     Feeling of Stress : To some extent   Social Connections: Unknown (7/24/2023)    Social Connection and Isolation Panel [NHANES]     Frequency of Communication with Friends and Family: More than three times a week     Frequency of Social Gatherings with Friends and Family: More than three times a week     Active Member of Clubs or Organizations: Yes     Attends Club or Organization Meetings: More than 4 times per year     Marital Status:    Housing Stability: Unknown (7/24/2023)    Housing Stability Vital Sign     Unable to Pay for Housing in the Last Year: No     Unstable Housing in the Last Year: No       Current Outpatient Medications   Medication Sig Dispense Refill    alcohol swabs (BD ALCOHOL SWABS) PadM Apply 1 each topically 3 (three) times daily. 200 each 6    aspirin 81 mg Cap       atorvastatin (LIPITOR) 40 MG tablet TAKE 1 TABLET (40 MG TOTAL) BY MOUTH ONCE DAILY. 90 tablet 1    blood sugar diagnostic (TRUE METRIX GLUCOSE TEST STRIP) Str CHECK BLOOD SUGAR THREE TIMES DAILY 300 strip 6    blood-glucose meter (TRUE METRIX AIR GLUCOSE METER) Misc Use as directed to check blood sugar. 1 each 0    cabergoline (DOSTINEX) 0.5 mg tablet Take by mouth. Take  "half of a table twice weekly.      citalopram (CELEXA) 20 MG tablet TAKE 1 TABLET EVERY DAY 90 tablet 1    clopidogreL (PLAVIX) 75 mg tablet TAKE 1 TABLET (75 MG TOTAL) BY MOUTH ONCE DAILY. 90 tablet 0    lancets (TRUEPLUS LANCETS) 28 gauge Misc Take glucose TID. 200 each 6    lansoprazole (PREVACID) 30 MG capsule TAKE 1 CAPSULE EVERY DAY 90 capsule 1    LEVOXYL 50 mcg tablet Take 50 mcg by mouth before breakfast.      METANX, ALGAL OIL, 3 mg-35 mg-2 mg -90.314 mg Cap Take 1 capsule by mouth 2 (two) times daily. 180 capsule 3    pen needle, diabetic (SURE COMFORT PEN NEEDLE) 32 gauge x 5/32" Ndle use as directed with lantus 200 each 6    risedronate (ACTONEL) 150 MG Tab Take 150 mg by mouth every 30 days.      tirzepatide 7.5 mg/0.5 mL PnIj Inject 7.5 mg into the skin every 7 days. 12 pen 2     No current facility-administered medications for this visit.       Review of patient's allergies indicates:   Allergen Reactions    Bacitracin Dermatitis     Tested positive for contact allergy to bacitracin (should also avoid neomycin, and polymixin if possible)--marino Obrien, 2015    Balsam peru Rash     Tested positive for contact allergy to Balsam of peru,used in flavorings and fragrances--had very pruritic, extensive rash but not clear which products provoked it.  marino Obrien, 2015    Epoxy resin Rash     Tested positive for contact allergy to epoxy resin--also to Carba mix, involved in rubber mfg--but couldn't pull that up     Ethylenediamine (bulk) Rash     Tested positive for contact allergy to ethylenediamine dihydrochloride, Marino Obrien, 2015--after prolonged pruritic rash---often present in fungicides, antibiotic creams, eye and nose drops.    Gold sodium thiosulfate Rash     Tested positive for contact allergy by marino Obrien, 2015--after having severe pruritic rashes    Latex, natural rubber Rash    Metformin Diarrhea     Really affected things    Sulfa (sulfonamide antibiotics) Other " (See Comments)     Affects liver    Tixocortol pivalate Rash     --tested positive for contact allergy to tixocortol-21-pivalate, commonly found in topical anti-inflammatory products and related corticosteroids    Chromium and derivatives Rash    Gold salts     Losartan Rash         Review of Systems   Constitutional: Negative for chills and fever.   Cardiovascular:  Positive for leg swelling. Negative for claudication.   Skin:  Positive for color change and nail changes.   Musculoskeletal:  Positive for joint swelling. Negative for joint pain, muscle cramps, muscle weakness and myalgias.   Neurological:  Positive for numbness. Negative for paresthesias.   Psychiatric/Behavioral:  Negative for altered mental status.            Objective:      Physical Exam  Constitutional:       General: She is not in acute distress.     Appearance: She is well-developed. She is not diaphoretic.   Cardiovascular:      Pulses:           Dorsalis pedis pulses are 2+ on the right side and 2+ on the left side.        Posterior tibial pulses are 2+ on the right side and 2+ on the left side.      Comments: CFT is < 3 seconds bilateral.  Pedal hair growth is decreased bilateral.  Varicosities noted bilateral.  Toes are cool to touch bilateral.    Musculoskeletal:         General: No tenderness.      Right lower le+ Edema present.      Left lower le+ Edema present.      Comments: Muscle strength 5/5 in all muscle groups bilateral.  No tenderness nor crepitation with ROM of foot/ankle joints bilateral.  Bilateral pes planus foot type.  Rectus alignment of all toes bilateral.     Skin:     General: Skin is warm and dry.      Capillary Refill: Capillary refill takes 2 to 3 seconds.      Coloration: Skin is not pale.      Findings: No abrasion, bruising, burn, ecchymosis, erythema, laceration, lesion, rash or wound.      Comments: Pedal skin appears edematous bilateral.  Toenails x 10 appear thickened by 2 mm, elongated by 4 mm, and  discolored with subungual debris.     Neurological:      Mental Status: She is alert and oriented to person, place, and time.      Sensory: Sensory deficit present.      Motor: Motor function is intact. No weakness or atrophy.      Comments: Protective sensation per [place]-Justyna monofilament is intact bilateral.  Vibratory sensation is decreased bilateral.  Light touch is intact bilateral.             Assessment:       Encounter Diagnoses   Name Primary?    Diabetic polyneuropathy associated with type 2 diabetes mellitus Yes    Venous insufficiency     Peripheral edema     Morbid obesity with BMI of 40.0-44.9, adult     Onychomycosis due to dermatophyte          Plan:       Alana was seen today for nail care.    Diagnoses and all orders for this visit:    Diabetic polyneuropathy associated with type 2 diabetes mellitus    Venous insufficiency    Peripheral edema    Morbid obesity with BMI of 40.0-44.9, adult    Onychomycosis due to dermatophyte      I counseled the patient on her conditions, their implications and medical management.    Recommend she resume use of compression stockings and elevation of the limbs while at rest.      Shoe inspection. Diabetic Foot Education. Patient reminded of the importance of good nutrition and blood sugar control to help prevent podiatric complications of diabetes. Patient instructed on proper foot hygeine. We discussed wearing proper shoe gear, daily foot inspections, never walking without protective shoe gear, never putting sharp instruments to feet    With patient's permission, nails were aggressively reduced and debrided x 10 to their soft tissue attachment mechanically and with electric , removing all offending nail and debris.  Patient relates relief following the procedure. She will continue to monitor the areas daily, inspect her feet, wear protective shoe gear when ambulatory, moisturizer to maintain skin integrity and follow in this office in approximately 3  months, sooner checo.      Sandoval Pierre DPM

## 2023-12-05 ENCOUNTER — TELEPHONE (OUTPATIENT)
Dept: PODIATRY | Facility: CLINIC | Age: 73
End: 2023-12-05
Payer: MEDICARE

## 2023-12-26 ENCOUNTER — PATIENT MESSAGE (OUTPATIENT)
Dept: ADMINISTRATIVE | Facility: OTHER | Age: 73
End: 2023-12-26
Payer: MEDICARE

## 2024-01-05 ENCOUNTER — OFFICE VISIT (OUTPATIENT)
Dept: PODIATRY | Facility: CLINIC | Age: 74
End: 2024-01-05
Payer: MEDICARE

## 2024-01-05 VITALS — HEIGHT: 65 IN | BODY MASS INDEX: 42.32 KG/M2 | WEIGHT: 254 LBS

## 2024-01-05 DIAGNOSIS — E66.01 MORBID OBESITY WITH BMI OF 40.0-44.9, ADULT: ICD-10-CM

## 2024-01-05 DIAGNOSIS — E11.42 DIABETIC POLYNEUROPATHY ASSOCIATED WITH TYPE 2 DIABETES MELLITUS: Primary | ICD-10-CM

## 2024-01-05 DIAGNOSIS — B35.1 ONYCHOMYCOSIS DUE TO DERMATOPHYTE: ICD-10-CM

## 2024-01-05 DIAGNOSIS — R60.0 PERIPHERAL EDEMA: ICD-10-CM

## 2024-01-05 DIAGNOSIS — I87.2 VENOUS INSUFFICIENCY: ICD-10-CM

## 2024-01-05 PROCEDURE — 1159F MED LIST DOCD IN RCRD: CPT | Mod: CPTII,S$GLB,, | Performed by: PODIATRIST

## 2024-01-05 PROCEDURE — 99999 PR PBB SHADOW E&M-EST. PATIENT-LVL III: CPT | Mod: PBBFAC,,, | Performed by: PODIATRIST

## 2024-01-05 PROCEDURE — 1101F PT FALLS ASSESS-DOCD LE1/YR: CPT | Mod: CPTII,S$GLB,, | Performed by: PODIATRIST

## 2024-01-05 PROCEDURE — 3008F BODY MASS INDEX DOCD: CPT | Mod: CPTII,S$GLB,, | Performed by: PODIATRIST

## 2024-01-05 PROCEDURE — 1126F AMNT PAIN NOTED NONE PRSNT: CPT | Mod: CPTII,S$GLB,, | Performed by: PODIATRIST

## 2024-01-05 PROCEDURE — 99213 OFFICE O/P EST LOW 20 MIN: CPT | Mod: 25,S$GLB,, | Performed by: PODIATRIST

## 2024-01-05 PROCEDURE — 3288F FALL RISK ASSESSMENT DOCD: CPT | Mod: CPTII,S$GLB,, | Performed by: PODIATRIST

## 2024-01-05 PROCEDURE — 11721 DEBRIDE NAIL 6 OR MORE: CPT | Mod: Q9,S$GLB,, | Performed by: PODIATRIST

## 2024-01-05 NOTE — PROGRESS NOTES
"  Subjective:      Patient ID: Alana Snow is a 73 y.o. female.    Chief Complaint: Diabetes Mellitus      Alana is a 73 y.o. female who presents to the clinic for evaluation and treatment of diabetic feet. Alana has a past medical history of AVM (arteriovenous malformation) of small bowel, acquired, Cataract, Colon polyp (01/11/2016), Coronary artery disease, Diabetes mellitus, type 2, Diverticulitis, DJD (degenerative joint disease) of knee, DJD (degenerative joint disease) of knee (3/31/2016), Drop foot gait, Hypertension, Internal hemorrhoid (01/11/2016), and Plantar fasciitis. Patient relates having toenails that are in need of trimming.  Denies being painful with wearing shoe gear.  Has not attempted to self trim.  Notes having the sensation of applied pressure, akin to the feeling of a "cuff", along the Lt. Ankle and rearfoot, however, denies this being overtly painful.  She continues with lower extremity swelling.  Denies recent bouts of venous blistering/stasis dermatitis.  Continues with excellent control over her blood glucose.  Denies any additional pedal complaints.       PCP: Sathish Johnson MD    Date Last Seen by PCP: 9/23    Hemoglobin A1C   Date Value Ref Range Status   08/07/2023 5.5 0.0 - 5.6 % Final     Comment:     Reference Interval:  5.0 - 5.6 Normal   5.7 - 6.4 High Risk   > 6.5 Diabetic      Hgb A1c results are standardized based on the (NGSP) National   Glycohemoglobin Standardization Program.      Hemoglobin A1C levels are related to mean serum/plasma glucose   during the preceding 2-3 months.        02/28/2023 6.8 (H) 0.0 - 5.6 % Final     Comment:     Reference Interval:  5.0 - 5.6 Normal   5.7 - 6.4 High Risk   > 6.5 Diabetic      Hgb A1c results are standardized based on the (NGSP) National   Glycohemoglobin Standardization Program.      Hemoglobin A1C levels are related to mean serum/plasma glucose   during the preceding 2-3 months.        10/25/2022 7.3 (H) 0.0 - 5.6 " % Final     Comment:     Reference Interval:  5.0 - 5.6 Normal   5.7 - 6.4 High Risk   > 6.5 Diabetic      Hgb A1c results are standardized based on the (NGSP) National   Glycohemoglobin Standardization Program.      Hemoglobin A1C levels are related to mean serum/plasma glucose   during the preceding 2-3 months.                Past Medical History:   Diagnosis Date    AVM (arteriovenous malformation) of small bowel, acquired     Cataract     Colon polyp 01/11/2016    Coronary artery disease     Diabetes mellitus, type 2     Diverticulitis     DJD (degenerative joint disease) of knee     left    DJD (degenerative joint disease) of knee 3/31/2016    Drop foot gait     right    Hypertension     Internal hemorrhoid 01/11/2016    Plantar fasciitis        Past Surgical History:   Procedure Laterality Date    ABDOMINAL SURGERY      Cholecystectomy    BREAST BIOPSY Right     excisional over 10 yrs. benign    BREAST CYST ASPIRATION      CARPAL TUNNEL RELEASE Left 6/26/10    per Dr. Ramirez    CARPAL TUNNEL RELEASE Right 8/14/10    per Dr. Ramirez    COLON SURGERY      sigmoidectomy    COLONOSCOPY N/A 1/11/2016    Procedure: COLONOSCOPY;  Surgeon: Blair López MD;  Location: [facility] ENDO;  Service: Endoscopy;  Laterality: N/A;    COLONOSCOPY N/A 3/24/2021    Procedure: COLONOSCOPY;  Surgeon: Blair López MD;  Location: [facility] ENDO;  Service: Endoscopy;  Laterality: N/A;    CORONARY STENT PLACEMENT      PILONIDAL CYST DRAINAGE      WRIST FRACTURE SURGERY Right 01/10/2018    clsd reduction w/application ex-fix       Family History   Problem Relation Age of Onset    Diabetes Mother     Cancer Father     Coronary artery disease Father     Breast cancer Paternal Grandmother         age unknown    Breast cancer Paternal Aunt         age unknown       Social History     Socioeconomic History    Marital status:    Tobacco Use    Smoking status: Never    Smokeless tobacco: Never   Substance and Sexual Activity    Alcohol  use: No     Alcohol/week: 0.0 standard drinks of alcohol    Drug use: No     Social Determinants of Health     Financial Resource Strain: Medium Risk (7/24/2023)    Overall Financial Resource Strain (CARDIA)     Difficulty of Paying Living Expenses: Somewhat hard   Food Insecurity: Food Insecurity Present (7/24/2023)    Hunger Vital Sign     Worried About Running Out of Food in the Last Year: Sometimes true     Ran Out of Food in the Last Year: Sometimes true   Transportation Needs: No Transportation Needs (7/24/2023)    PRAPARE - Transportation     Lack of Transportation (Medical): No     Lack of Transportation (Non-Medical): No   Physical Activity: Sufficiently Active (7/24/2023)    Exercise Vital Sign     Days of Exercise per Week: 5 days     Minutes of Exercise per Session: 30 min   Stress: Stress Concern Present (7/24/2023)    [employer] [place] [employer] - Occupational Stress Questionnaire     Feeling of Stress : To some extent   Social Connections: Unknown (7/24/2023)    Social Connection and Isolation Panel [NHANES]     Frequency of Communication with Friends and Family: More than three times a week     Frequency of Social Gatherings with Friends and Family: More than three times a week     Active Member of Clubs or Organizations: Yes     Attends Club or Organization Meetings: More than 4 times per year     Marital Status:    Housing Stability: Unknown (7/24/2023)    Housing Stability Vital Sign     Unable to Pay for Housing in the Last Year: No     Unstable Housing in the Last Year: No       Current Outpatient Medications   Medication Sig Dispense Refill    alcohol swabs (BD ALCOHOL SWABS) PadM Apply 1 each topically 3 (three) times daily. 200 each 6    aspirin 81 mg Cap       atorvastatin (LIPITOR) 40 MG tablet TAKE 1 TABLET (40 MG TOTAL) BY MOUTH ONCE DAILY. 90 tablet 1    blood sugar diagnostic (TRUE METRIX GLUCOSE TEST STRIP) Strp CHECK BLOOD SUGAR THREE TIMES DAILY 300 strip 6     "blood-glucose meter (TRUE METRIX AIR GLUCOSE METER) Misc Use as directed to check blood sugar. 1 each 0    cabergoline (DOSTINEX) 0.5 mg tablet Take by mouth. Take half of a table twice weekly.      citalopram (CELEXA) 20 MG tablet TAKE 1 TABLET EVERY DAY 90 tablet 1    clopidogreL (PLAVIX) 75 mg tablet TAKE 1 TABLET (75 MG TOTAL) BY MOUTH ONCE DAILY. 90 tablet 0    lancets (TRUEPLUS LANCETS) 28 gauge Misc Take glucose TID. 200 each 6    lansoprazole (PREVACID) 30 MG capsule TAKE 1 CAPSULE EVERY DAY 90 capsule 1    LEVOXYL 50 mcg tablet Take 50 mcg by mouth before breakfast.      METANX, ALGAL OIL, 3 mg-35 mg-2 mg -90.314 mg Cap Take 1 capsule by mouth 2 (two) times daily. 180 capsule 3    pen needle, diabetic (SURE COMFORT PEN NEEDLE) 32 gauge x 5/32" Ndle use as directed with lantus 200 each 6    risedronate (ACTONEL) 150 MG Tab Take 150 mg by mouth every 30 days.      tirzepatide 7.5 mg/0.5 mL PnIj Inject 7.5 mg into the skin every 7 days. 12 pen 2     No current facility-administered medications for this visit.       Review of patient's allergies indicates:   Allergen Reactions    Bacitracin Dermatitis     Tested positive for contact allergy to bacitracin (should also avoid neomycin, and polymixin if possible)--marino Obrien, 2015    Balsam peru Rash     Tested positive for contact allergy to Balsam of peru,used in flavorings and fragrances--had very pruritic, extensive rash but not clear which products provoked it.  marino Obrien, 2015    Epoxy resin Rash     Tested positive for contact allergy to epoxy resin--also to Carba mix, involved in rubber mfg--but couldn't pull that up     Ethylenediamine (bulk) Rash     Tested positive for contact allergy to ethylenediamine dihydrochloride, Marino Obrien, 2015--after prolonged pruritic rash---often present in fungicides, antibiotic creams, eye and nose drops.    Gold sodium thiosulfate Rash     Tested positive for contact allergy by marino Obrien, " 2015--after having severe pruritic rashes    Latex, natural rubber Rash    Metformin Diarrhea     Really affected things    Sulfa (sulfonamide antibiotics) Other (See Comments)     Affects liver    Tixocortol pivalate Rash     --tested positive for contact allergy to tixocortol-21-pivalate, commonly found in topical anti-inflammatory products and related corticosteroids    Chromium and derivatives Rash    Gold salts     Losartan Rash         Review of Systems   Constitutional: Negative for chills and fever.   Cardiovascular:  Positive for leg swelling. Negative for claudication.   Skin:  Positive for color change and nail changes.   Musculoskeletal:  Positive for joint swelling. Negative for joint pain, muscle cramps, muscle weakness and myalgias.   Neurological:  Positive for numbness. Negative for paresthesias.   Psychiatric/Behavioral:  Negative for altered mental status.            Objective:      Physical Exam  Constitutional:       General: She is not in acute distress.     Appearance: She is well-developed. She is not diaphoretic.   Cardiovascular:      Pulses:           Dorsalis pedis pulses are 2+ on the right side and 2+ on the left side.        Posterior tibial pulses are 2+ on the right side and 2+ on the left side.      Comments: CFT is < 3 seconds bilateral.  Pedal hair growth is decreased bilateral.  Varicosities noted bilateral.  Toes are cool to touch bilateral.    Musculoskeletal:         General: No tenderness.      Right lower le+ Edema present.      Left lower le+ Edema present.      Comments: Muscle strength 5/5 in all muscle groups bilateral.  No tenderness nor crepitation with ROM of foot/ankle joints bilateral.  Bilateral pes planus foot type.  Rectus alignment of all toes bilateral.     Skin:     General: Skin is warm and dry.      Capillary Refill: Capillary refill takes 2 to 3 seconds.      Coloration: Skin is not pale.      Findings: No abrasion, bruising, burn, ecchymosis,  erythema, laceration, lesion, rash or wound.      Comments: Pedal skin appears edematous bilateral.  Toenails x 10 appear thickened by 2 mm, elongated by 4 mm, and discolored with subungual debris.     Neurological:      Mental Status: She is alert and oriented to person, place, and time.      Sensory: Sensory deficit present.      Motor: Motor function is intact. No weakness or atrophy.      Comments: Protective sensation per Lottie-Justyna monofilament is intact bilateral.  Vibratory sensation is decreased bilateral.  Light touch is intact bilateral.               Assessment:       Encounter Diagnoses   Name Primary?    Diabetic polyneuropathy associated with type 2 diabetes mellitus Yes    Venous insufficiency     Peripheral edema     Morbid obesity with BMI of 40.0-44.9, adult     Onychomycosis due to dermatophyte          Plan:       Alana was seen today for diabetes mellitus.    Diagnoses and all orders for this visit:    Diabetic polyneuropathy associated with type 2 diabetes mellitus    Venous insufficiency    Peripheral edema    Morbid obesity with BMI of 40.0-44.9, adult    Onychomycosis due to dermatophyte      I counseled the patient on her conditions, their implications and medical management.    Recommend she resume use of compression stockings and elevation of the limbs while at rest.      Discussed the importance of diet and exercise as they pertain to diabetes management and maintaining a healthy BMI.     Shoe inspection. Diabetic Foot Education. Patient reminded of the importance of good nutrition and blood sugar control to help prevent podiatric complications of diabetes. Patient instructed on proper foot hygeine. We discussed wearing proper shoe gear, daily foot inspections, never walking without protective shoe gear, never putting sharp instruments to feet    With patient's permission, nails were aggressively reduced and debrided x 10 to their soft tissue attachment mechanically and with  electric , removing all offending nail and debris.  Patient relates relief following the procedure. She will continue to monitor the areas daily, inspect her feet, wear protective shoe gear when ambulatory, moisturizer to maintain skin integrity and follow in this office in approximately 3 months, sooner p.r.n.      Sandoval Pierre DPM

## 2024-04-05 ENCOUNTER — OFFICE VISIT (OUTPATIENT)
Dept: PODIATRY | Facility: CLINIC | Age: 74
End: 2024-04-05
Payer: MEDICARE

## 2024-04-05 VITALS — HEIGHT: 65 IN | BODY MASS INDEX: 42.32 KG/M2 | WEIGHT: 254 LBS

## 2024-04-05 DIAGNOSIS — E11.42 DIABETIC POLYNEUROPATHY ASSOCIATED WITH TYPE 2 DIABETES MELLITUS: Primary | ICD-10-CM

## 2024-04-05 DIAGNOSIS — R60.0 PERIPHERAL EDEMA: ICD-10-CM

## 2024-04-05 DIAGNOSIS — E66.01 MORBID OBESITY WITH BMI OF 40.0-44.9, ADULT: ICD-10-CM

## 2024-04-05 DIAGNOSIS — B35.1 ONYCHOMYCOSIS DUE TO DERMATOPHYTE: ICD-10-CM

## 2024-04-05 DIAGNOSIS — I87.2 VENOUS INSUFFICIENCY: ICD-10-CM

## 2024-04-05 PROCEDURE — 99999 PR PBB SHADOW E&M-EST. PATIENT-LVL III: CPT | Mod: PBBFAC,,, | Performed by: PODIATRIST

## 2024-04-05 PROCEDURE — 99499 UNLISTED E&M SERVICE: CPT | Mod: Q9,S$GLB,, | Performed by: PODIATRIST

## 2024-04-05 PROCEDURE — 11721 DEBRIDE NAIL 6 OR MORE: CPT | Mod: S$GLB,,, | Performed by: PODIATRIST

## 2024-04-05 RX ORDER — ACETAMINOPHEN 500 MG
1 TABLET ORAL EVERY MORNING
COMMUNITY

## 2024-04-05 NOTE — PROGRESS NOTES
Subjective:      Patient ID: Alana Snow is a 74 y.o. female.    Chief Complaint: Foot Pain and Nail Care (3 months nail care, left foot pain)      Alana is a 74 y.o. female who presents to the clinic for evaluation and treatment of diabetic feet. Alana has a past medical history of AVM (arteriovenous malformation) of small bowel, acquired, Cataract, Colon polyp (01/11/2016), Coronary artery disease, Diabetes mellitus, type 2, Diverticulitis, DJD (degenerative joint disease) of knee, DJD (degenerative joint disease) of knee (3/31/2016), Drop foot gait, Hypertension, Internal hemorrhoid (01/11/2016), and Plantar fasciitis. Patient relates having toenails that are in need of trimming.  Denies being painful with wearing shoe gear.  Has not attempted to self trim.  Continues with excellent control over her blood glucose.  Denies any additional pedal complaints.       PCP: Sathish Johnson MD    Date Last Seen by PCP: 3/24    Hemoglobin A1C   Date Value Ref Range Status   08/07/2023 5.5 0.0 - 5.6 % Final     Comment:     Reference Interval:  5.0 - 5.6 Normal   5.7 - 6.4 High Risk   > 6.5 Diabetic      Hgb A1c results are standardized based on the (NGSP) National   Glycohemoglobin Standardization Program.      Hemoglobin A1C levels are related to mean serum/plasma glucose   during the preceding 2-3 months.        02/28/2023 6.8 (H) 0.0 - 5.6 % Final     Comment:     Reference Interval:  5.0 - 5.6 Normal   5.7 - 6.4 High Risk   > 6.5 Diabetic      Hgb A1c results are standardized based on the (NGSP) National   Glycohemoglobin Standardization Program.      Hemoglobin A1C levels are related to mean serum/plasma glucose   during the preceding 2-3 months.        10/25/2022 7.3 (H) 0.0 - 5.6 % Final     Comment:     Reference Interval:  5.0 - 5.6 Normal   5.7 - 6.4 High Risk   > 6.5 Diabetic      Hgb A1c results are standardized based on the (NGSP) National   Glycohemoglobin Standardization Program.       Hemoglobin A1C levels are related to mean serum/plasma glucose   during the preceding 2-3 months.                Past Medical History:   Diagnosis Date    AVM (arteriovenous malformation) of small bowel, acquired     Cataract     Colon polyp 01/11/2016    Coronary artery disease     Diabetes mellitus, type 2     Diverticulitis     DJD (degenerative joint disease) of knee     left    DJD (degenerative joint disease) of knee 3/31/2016    Drop foot gait     right    Hypertension     Internal hemorrhoid 01/11/2016    Plantar fasciitis        Past Surgical History:   Procedure Laterality Date    ABDOMINAL SURGERY      Cholecystectomy    BREAST BIOPSY Right     excisional over 10 yrs. benign    BREAST CYST ASPIRATION      CARPAL TUNNEL RELEASE Left 6/26/10    per Dr. Ramirez    CARPAL TUNNEL RELEASE Right 8/14/10    per Dr. Ramirez    COLON SURGERY      sigmoidectomy    COLONOSCOPY N/A 1/11/2016    Procedure: COLONOSCOPY;  Surgeon: Blair López MD;  Location: [facility] ENDO;  Service: Endoscopy;  Laterality: N/A;    COLONOSCOPY N/A 3/24/2021    Procedure: COLONOSCOPY;  Surgeon: Blair López MD;  Location: [facility] ENDO;  Service: Endoscopy;  Laterality: N/A;    CORONARY STENT PLACEMENT      PILONIDAL CYST DRAINAGE      WRIST FRACTURE SURGERY Right 01/10/2018    clsd reduction w/application ex-fix       Family History   Problem Relation Age of Onset    Diabetes Mother     Cancer Father     Coronary artery disease Father     Breast cancer Paternal Grandmother         age unknown    Breast cancer Paternal Aunt         age unknown       Social History     Socioeconomic History    Marital status:    Tobacco Use    Smoking status: Never    Smokeless tobacco: Never   Substance and Sexual Activity    Alcohol use: No     Alcohol/week: 0.0 standard drinks of alcohol    Drug use: No     Social Determinants of Health     Financial Resource Strain: Medium Risk (7/24/2023)    Overall Financial Resource Strain (CARDIA)      Difficulty of Paying Living Expenses: Somewhat hard   Food Insecurity: Food Insecurity Present (7/24/2023)    Hunger Vital Sign     Worried About Running Out of Food in the Last Year: Sometimes true     Ran Out of Food in the Last Year: Sometimes true   Transportation Needs: No Transportation Needs (7/24/2023)    PRAPARE - Transportation     Lack of Transportation (Medical): No     Lack of Transportation (Non-Medical): No   Physical Activity: Sufficiently Active (7/24/2023)    Exercise Vital Sign     Days of Exercise per Week: 5 days     Minutes of Exercise per Session: 30 min   Stress: Stress Concern Present (7/24/2023)    [employer] [place] [employer] - Occupational Stress Questionnaire     Feeling of Stress : To some extent   Social Connections: Unknown (7/24/2023)    Social Connection and Isolation Panel [NHANES]     Frequency of Communication with Friends and Family: More than three times a week     Frequency of Social Gatherings with Friends and Family: More than three times a week     Active Member of Clubs or Organizations: Yes     Attends Club or Organization Meetings: More than 4 times per year     Marital Status:    Housing Stability: Unknown (7/24/2023)    Housing Stability Vital Sign     Unable to Pay for Housing in the Last Year: No     Unstable Housing in the Last Year: No       Current Outpatient Medications   Medication Sig Dispense Refill    alcohol swabs (BD ALCOHOL SWABS) PadM Apply 1 each topically 3 (three) times daily. 200 each 6    aspirin 81 mg Cap       atorvastatin (LIPITOR) 40 MG tablet TAKE 1 TABLET (40 MG TOTAL) BY MOUTH ONCE DAILY. 90 tablet 1    blood sugar diagnostic (TRUE METRIX GLUCOSE TEST STRIP) Strp CHECK BLOOD SUGAR THREE TIMES DAILY 300 strip 6    blood-glucose meter (TRUE METRIX AIR GLUCOSE METER) Misc Use as directed to check blood sugar. 1 each 0    cholecalciferol, vitamin D3, 125 mcg (5,000 unit) Tab Take 1 tablet by mouth every morning.      citalopram  "(CELEXA) 20 MG tablet TAKE 1 TABLET EVERY DAY 90 tablet 1    clopidogreL (PLAVIX) 75 mg tablet TAKE 1 TABLET (75 MG TOTAL) BY MOUTH ONCE DAILY. 90 tablet 0    lancets (TRUEPLUS LANCETS) 28 gauge Misc Take glucose TID. 200 each 6    lansoprazole (PREVACID) 30 MG capsule TAKE 1 CAPSULE EVERY DAY 90 capsule 1    LEVOXYL 50 mcg tablet Take 50 mcg by mouth before breakfast.      METANX, ALGAL OIL, 3 mg-35 mg-2 mg -90.314 mg Cap Take 1 capsule by mouth 2 (two) times daily. 180 capsule 3    pen needle, diabetic (SURE COMFORT PEN NEEDLE) 32 gauge x 5/32" Ndle use as directed with lantus 200 each 6    risedronate (ACTONEL) 150 MG Tab Take 150 mg by mouth every 30 days.      tirzepatide 7.5 mg/0.5 mL PnIj Inject 7.5 mg into the skin every 7 days. 12 pen 2    cabergoline (DOSTINEX) 0.5 mg tablet Take by mouth. Take half of a table twice weekly.       No current facility-administered medications for this visit.       Review of patient's allergies indicates:   Allergen Reactions    Bacitracin Dermatitis     Tested positive for contact allergy to bacitracin (should also avoid neomycin, and polymixin if possible)--marino Obrien, 2015    Balsam peru Rash     Tested positive for contact allergy to Balsam of peru,used in flavorings and fragrances--had very pruritic, extensive rash but not clear which products provoked it.  marino Obrien, 2015    Epoxy resin Rash     Tested positive for contact allergy to epoxy resin--also to Carba mix, involved in rubber mfg--but couldn't pull that up     Ethylenediamine (bulk) Rash     Tested positive for contact allergy to ethylenediamine dihydrochloride, Marino Obrien, 2015--after prolonged pruritic rash---often present in fungicides, antibiotic creams, eye and nose drops.    Gold sodium thiosulfate Rash     Tested positive for contact allergy by marino Obrien, 2015--after having severe pruritic rashes    Latex, natural rubber Rash    Metformin Diarrhea     Really affected " things    Sulfa (sulfonamide antibiotics) Other (See Comments)     Affects liver    Tixocortol pivalate Rash     --tested positive for contact allergy to tixocortol-21-pivalate, commonly found in topical anti-inflammatory products and related corticosteroids    Chromium and derivatives Rash    Gold salts     Losartan Rash         Review of Systems   Constitutional: Negative for chills and fever.   Cardiovascular:  Positive for leg swelling. Negative for claudication.   Skin:  Positive for color change and nail changes.   Musculoskeletal:  Positive for joint swelling. Negative for joint pain, muscle cramps, muscle weakness and myalgias.   Neurological:  Positive for numbness. Negative for paresthesias.   Psychiatric/Behavioral:  Negative for altered mental status.            Objective:      Physical Exam  Constitutional:       General: She is not in acute distress.     Appearance: She is well-developed. She is not diaphoretic.   Cardiovascular:      Pulses:           Dorsalis pedis pulses are 2+ on the right side and 2+ on the left side.        Posterior tibial pulses are 2+ on the right side and 2+ on the left side.      Comments: CFT is < 3 seconds bilateral.  Pedal hair growth is decreased bilateral.  Varicosities noted bilateral.  Toes are cool to touch bilateral.    Musculoskeletal:         General: No tenderness.      Right lower le+ Edema present.      Left lower le+ Edema present.      Comments: Muscle strength 5/5 in all muscle groups bilateral.  No tenderness nor crepitation with ROM of foot/ankle joints bilateral.  Bilateral pes planus foot type.  Rectus alignment of all toes bilateral.     Skin:     General: Skin is warm and dry.      Capillary Refill: Capillary refill takes 2 to 3 seconds.      Coloration: Skin is not pale.      Findings: No abrasion, bruising, burn, ecchymosis, erythema, laceration, lesion, rash or wound.      Comments: Pedal skin appears edematous bilateral.  Toenails x 10  appear thickened by 2 mm, elongated by 4 mm, and discolored with subungual debris.     Neurological:      Mental Status: She is alert and oriented to person, place, and time.      Sensory: Sensory deficit present.      Motor: Motor function is intact. No weakness or atrophy.      Comments: Protective sensation per [place] monofilament is intact bilateral.  Vibratory sensation is decreased bilateral.  Light touch is intact bilateral.               Assessment:       Encounter Diagnoses   Name Primary?    Diabetic polyneuropathy associated with type 2 diabetes mellitus Yes    Venous insufficiency     Peripheral edema     Morbid obesity with BMI of 40.0-44.9, adult     Onychomycosis due to dermatophyte            Plan:       Alana was seen today for foot pain and nail care.    Diagnoses and all orders for this visit:    Diabetic polyneuropathy associated with type 2 diabetes mellitus    Venous insufficiency    Peripheral edema    Morbid obesity with BMI of 40.0-44.9, adult    Onychomycosis due to dermatophyte        I counseled the patient on her conditions, their implications and medical management.    To continue with compression of the lower extremities.      Advised to inspect for stasis dermatitis and venous blistering of the LEs.      Discussed the importance of diet and exercise as they pertain to diabetes management and maintaining a healthy BMI.     Shoe inspection. Diabetic Foot Education. Patient reminded of the importance of good nutrition and blood sugar control to help prevent podiatric complications of diabetes. Patient instructed on proper foot hygeine. We discussed wearing proper shoe gear, daily foot inspections, never walking without protective shoe gear, never putting sharp instruments to feet    With patient's permission, nails were aggressively reduced and debrided x 10 to their soft tissue attachment mechanically and with electric , removing all offending nail and debris.   Patient relates relief following the procedure. She will continue to monitor the areas daily, inspect her feet, wear protective shoe gear when ambulatory, moisturizer to maintain skin integrity and follow in this office in approximately 3 months, sooner p.r.n.      Sandoval Pierre DPM

## 2024-07-05 ENCOUNTER — TELEPHONE (OUTPATIENT)
Dept: PODIATRY | Facility: CLINIC | Age: 74
End: 2024-07-05
Payer: MEDICARE

## 2024-07-05 NOTE — TELEPHONE ENCOUNTER
----- Message from Vilma Pinto sent at 7/5/2024 10:04 AM CDT -----  Contact: self  Type: Sooner Appointment Request        Caller is requesting a sooner appointment. Caller declined first available appointment listed below. Caller will not accept being placed on the waitlist and is requesting a message be sent to doctor.        Name of Caller: Patient   Best Call Back Number: [phone]  Additional Information: Pt had car trouble this morning couldn't make her appt she is calling back now  to get a later time today if possible. Plz call to r/s. Thanks

## 2024-09-09 ENCOUNTER — OFFICE VISIT (OUTPATIENT)
Dept: PODIATRY | Facility: CLINIC | Age: 74
End: 2024-09-09
Payer: MEDICARE

## 2024-09-09 VITALS — WEIGHT: 254 LBS | HEIGHT: 65 IN | BODY MASS INDEX: 42.32 KG/M2

## 2024-09-09 DIAGNOSIS — L84 CORN OR CALLUS: ICD-10-CM

## 2024-09-09 DIAGNOSIS — I87.2 VENOUS INSUFFICIENCY: ICD-10-CM

## 2024-09-09 DIAGNOSIS — E11.42 DIABETIC POLYNEUROPATHY ASSOCIATED WITH TYPE 2 DIABETES MELLITUS: Primary | ICD-10-CM

## 2024-09-09 DIAGNOSIS — E66.01 MORBID OBESITY WITH BMI OF 40.0-44.9, ADULT: ICD-10-CM

## 2024-09-09 DIAGNOSIS — R60.0 PERIPHERAL EDEMA: ICD-10-CM

## 2024-09-09 DIAGNOSIS — B35.1 ONYCHOMYCOSIS DUE TO DERMATOPHYTE: ICD-10-CM

## 2024-09-09 PROCEDURE — 11721 DEBRIDE NAIL 6 OR MORE: CPT | Mod: Q9,59,S$GLB, | Performed by: PODIATRIST

## 2024-09-09 PROCEDURE — 99999 PR PBB SHADOW E&M-EST. PATIENT-LVL II: CPT | Mod: PBBFAC,,, | Performed by: PODIATRIST

## 2024-09-09 PROCEDURE — 11056 PARNG/CUTG B9 HYPRKR LES 2-4: CPT | Mod: Q9,S$GLB,, | Performed by: PODIATRIST

## 2024-09-09 PROCEDURE — 99499 UNLISTED E&M SERVICE: CPT | Mod: S$GLB,,, | Performed by: PODIATRIST

## 2024-09-09 NOTE — PROGRESS NOTES
Subjective:      Patient ID: Alana Snow is a 74 y.o. female.    Chief Complaint: Diabetes Mellitus and Diabetic Foot Exam      Alana is a 74 y.o. female who presents to the clinic for evaluation and treatment of diabetic feet. Alana has a past medical history of AVM (arteriovenous malformation) of small bowel, acquired, Cataract, Colon polyp (01/11/2016), Coronary artery disease, Diabetes mellitus, type 2, Diverticulitis, DJD (degenerative joint disease) of knee, DJD (degenerative joint disease) of knee (3/31/2016), Drop foot gait, Hypertension, Internal hemorrhoid (01/11/2016), and Plantar fasciitis. Patient relates having toenails that are in need of trimming.  Denies being painful with wearing shoe gear.  Has not attempted to self trim.  She notes good control over her blood glucose.  Also, notes having corns to bilateral forefoot that are tender to touch.  Has not attempted to self treat.  Denies any additional pedal complaints.       PCP: Sathish Johnson MD    Date Last Seen by PCP: 6/24    Hemoglobin A1C   Date Value Ref Range Status   08/07/2023 5.5 0.0 - 5.6 % Final     Comment:     Reference Interval:  5.0 - 5.6 Normal   5.7 - 6.4 High Risk   > 6.5 Diabetic      Hgb A1c results are standardized based on the (NGSP) National   Glycohemoglobin Standardization Program.      Hemoglobin A1C levels are related to mean serum/plasma glucose   during the preceding 2-3 months.        02/28/2023 6.8 (H) 0.0 - 5.6 % Final     Comment:     Reference Interval:  5.0 - 5.6 Normal   5.7 - 6.4 High Risk   > 6.5 Diabetic      Hgb A1c results are standardized based on the (NGSP) National   Glycohemoglobin Standardization Program.      Hemoglobin A1C levels are related to mean serum/plasma glucose   during the preceding 2-3 months.        10/25/2022 7.3 (H) 0.0 - 5.6 % Final     Comment:     Reference Interval:  5.0 - 5.6 Normal   5.7 - 6.4 High Risk   > 6.5 Diabetic      Hgb A1c results are standardized based  on the (NGSP) National   Glycohemoglobin Standardization Program.      Hemoglobin A1C levels are related to mean serum/plasma glucose   during the preceding 2-3 months.                Past Medical History:   Diagnosis Date    AVM (arteriovenous malformation) of small bowel, acquired     Cataract     Colon polyp 01/11/2016    Coronary artery disease     Diabetes mellitus, type 2     Diverticulitis     DJD (degenerative joint disease) of knee     left    DJD (degenerative joint disease) of knee 3/31/2016    Drop foot gait     right    Hypertension     Internal hemorrhoid 01/11/2016    Plantar fasciitis        Past Surgical History:   Procedure Laterality Date    ABDOMINAL SURGERY      Cholecystectomy    BREAST BIOPSY Right     excisional over 10 yrs. benign    BREAST CYST ASPIRATION      CARPAL TUNNEL RELEASE Left 6/26/10    per Dr. Ramirez    CARPAL TUNNEL RELEASE Right 8/14/10    per Dr. Ramirez    COLON SURGERY      sigmoidectomy    COLONOSCOPY N/A 1/11/2016    Procedure: COLONOSCOPY;  Surgeon: Blair López MD;  Location: [facility] ENDO;  Service: Endoscopy;  Laterality: N/A;    COLONOSCOPY N/A 3/24/2021    Procedure: COLONOSCOPY;  Surgeon: Blair López MD;  Location: [facility] ENDO;  Service: Endoscopy;  Laterality: N/A;    CORONARY STENT PLACEMENT      PILONIDAL CYST DRAINAGE      WRIST FRACTURE SURGERY Right 01/10/2018    clsd reduction w/application ex-fix       Family History   Problem Relation Name Age of Onset    Diabetes Mother      Cancer Father      Coronary artery disease Father      Breast cancer Paternal Grandmother          age unknown    Breast cancer Paternal Aunt          age unknown       Social History     Socioeconomic History    Marital status:    Tobacco Use    Smoking status: Never    Smokeless tobacco: Never   Substance and Sexual Activity    Alcohol use: No     Alcohol/week: 0.0 standard drinks of alcohol    Drug use: No     Social Determinants of Health     Financial Resource  Strain: Medium Risk (9/3/2024)    Received from [facility]    Overall Financial Resource Strain (CARDIA)     Difficulty of Paying Living Expenses: Somewhat hard   Food Insecurity: Food Insecurity Present (9/3/2024)    Received from [facility]    Hunger Vital Sign     Worried About Running Out of Food in the Last Year: Sometimes true     Ran Out of Food in the Last Year: Never true   Transportation Needs: No Transportation Needs (9/3/2024)    Received from [facility]    PRAPARE - Transportation     Lack of Transportation (Medical): No     Lack of Transportation (Non-Medical): No   Physical Activity: Insufficiently Active (9/3/2024)    Received from [facility]    Exercise Vital Sign     Days of Exercise per Week: 3 days     Minutes of Exercise per Session: 10 min   Stress: No Stress Concern Present (9/3/2024)    Received from [facility]    [place] [employer] - Occupational Stress Questionnaire     Feeling of Stress : Not at all   Housing Stability: Unknown (7/24/2023)    Housing Stability Vital Sign     Unable to Pay for Housing in the Last Year: No     Unstable Housing in the Last Year: No       Current Outpatient Medications   Medication Sig Dispense Refill    alcohol swabs (BD ALCOHOL SWABS) PadM Apply 1 each topically 3 (three) times daily. 200 each 6    aspirin 81 mg Cap       atorvastatin (LIPITOR) 40 MG tablet TAKE 1 TABLET (40 MG TOTAL) BY MOUTH ONCE DAILY. 90 tablet 1    blood sugar diagnostic (TRUE METRIX GLUCOSE TEST STRIP) Str CHECK BLOOD SUGAR THREE TIMES DAILY 300 strip 6    blood-glucose meter (TRUE METRIX AIR GLUCOSE METER) Misc Use as directed to check blood sugar. 1 each 0    cabergoline (DOSTINEX) 0.5 mg tablet Take by mouth. Take half of a table twice weekly.      cholecalciferol, vitamin D3, 125 mcg (5,000 unit) Tab Take 1 tablet by mouth every morning.      citalopram (CELEXA) 20 MG tablet TAKE 1 TABLET EVERY DAY 90 tablet 1    clopidogreL (PLAVIX) 75 mg tablet TAKE 1  "TABLET (75 MG TOTAL) BY MOUTH ONCE DAILY. 90 tablet 0    lancets (TRUEPLUS LANCETS) 28 gauge Misc Take glucose TID. 200 each 6    lansoprazole (PREVACID) 30 MG capsule TAKE 1 CAPSULE EVERY DAY 90 capsule 1    LEVOXYL 50 mcg tablet Take 50 mcg by mouth before breakfast.      METANX, ALGAL OIL, 3 mg-35 mg-2 mg -90.314 mg Cap Take 1 capsule by mouth 2 (two) times daily. 180 capsule 3    pen needle, diabetic (SURE COMFORT PEN NEEDLE) 32 gauge x 5/32" Ndle use as directed with lantus 200 each 6    risedronate (ACTONEL) 150 MG Tab Take 150 mg by mouth every 30 days.      tirzepatide 7.5 mg/0.5 mL PnIj Inject 7.5 mg into the skin every 7 days. 12 pen 2     No current facility-administered medications for this visit.       Review of patient's allergies indicates:   Allergen Reactions    Bacitracin Dermatitis     Tested positive for contact allergy to bacitracin (should also avoid neomycin, and polymixin if possible)--marino Obrien, 2015    Balsam peru Rash     Tested positive for contact allergy to Balsam of peru,used in flavorings and fragrances--had very pruritic, extensive rash but not clear which products provoked it.  marino Obrien, 2015    Epoxy resin Rash     Tested positive for contact allergy to epoxy resin--also to Carba mix, involved in rubber mfg--but couldn't pull that up     Ethylenediamine (bulk) Rash     Tested positive for contact allergy to ethylenediamine dihydrochloride, Marino Obrien, 2015--after prolonged pruritic rash---often present in fungicides, antibiotic creams, eye and nose drops.    Gold sodium thiosulfate Rash     Tested positive for contact allergy by marino Obrien, 2015--after having severe pruritic rashes    Latex, natural rubber Rash    Metformin Diarrhea     Really affected things    Sulfa (sulfonamide antibiotics) Other (See Comments)     Affects liver    Tixocortol pivalate Rash     --tested positive for contact allergy to tixocortol-21-pivalate, commonly found in " topical anti-inflammatory products and related corticosteroids    Chromium and derivatives Rash    Gold salts     Losartan Rash         Review of Systems   Constitutional: Negative for chills and fever.   Cardiovascular:  Positive for leg swelling. Negative for claudication.   Skin:  Positive for color change and nail changes.   Musculoskeletal:  Positive for joint swelling. Negative for joint pain, muscle cramps, muscle weakness and myalgias.   Neurological:  Positive for numbness. Negative for paresthesias.   Psychiatric/Behavioral:  Negative for altered mental status.            Objective:      Physical Exam  Constitutional:       General: She is not in acute distress.     Appearance: She is well-developed. She is not diaphoretic.   Cardiovascular:      Pulses:           Dorsalis pedis pulses are 2+ on the right side and 2+ on the left side.        Posterior tibial pulses are 2+ on the right side and 2+ on the left side.      Comments: CFT is < 3 seconds bilateral.  Pedal hair growth is decreased bilateral.  Varicosities noted bilateral.  Toes are cool to touch bilateral.    Musculoskeletal:         General: Tenderness present.      Right lower le+ Edema present.      Left lower le+ Edema present.      Comments: Muscle strength 5/5 in all muscle groups bilateral.  No tenderness nor crepitation with ROM of foot/ankle joints bilateral.  Bilateral pes planus foot type.  Rectus alignment of all toes bilateral.  Pain with palpation to lesions of bilateral sub 5th met head.     Skin:     General: Skin is warm and dry.      Capillary Refill: Capillary refill takes 2 to 3 seconds.      Coloration: Skin is not pale.      Findings: Lesion present. No abrasion, bruising, burn, ecchymosis, erythema, laceration, rash or wound.      Comments: Pedal skin appears edematous bilateral.  Toenails x 10 appear thickened by 2 mm, elongated by 8 mm, and discolored with subungual debris.  Hyperkeratotic lesion noted to bilateral  sub 5th met head.     Neurological:      Mental Status: She is alert and oriented to person, place, and time.      Sensory: Sensory deficit present.      Motor: Motor function is intact. No weakness or atrophy.      Comments: Protective sensation per [place] monofilament is intact bilateral.  Vibratory sensation is decreased bilateral.  Light touch is intact bilateral.               Assessment:       Encounter Diagnoses   Name Primary?    Diabetic polyneuropathy associated with type 2 diabetes mellitus Yes    Venous insufficiency     Peripheral edema     Onychomycosis due to dermatophyte     Corn or callus     Morbid obesity with BMI of 40.0-44.9, adult            Plan:       Alana was seen today for diabetes mellitus and diabetic foot exam.    Diagnoses and all orders for this visit:    Diabetic polyneuropathy associated with type 2 diabetes mellitus    Venous insufficiency    Peripheral edema    Onychomycosis due to dermatophyte    Corn or callus    Morbid obesity with BMI of 40.0-44.9, adult        I counseled the patient on her conditions, their implications and medical management.    To continue with compression of the lower extremities.      Advised to inspect for stasis dermatitis and venous blistering of the LEs.      Discussed the importance of diet and exercise as they pertain to diabetes management and maintaining a healthy BMI.     Recommend applying ebanel lotion to areas of callus build up BID.    Shoe inspection. Diabetic Foot Education. Patient reminded of the importance of good nutrition and blood sugar control to help prevent podiatric complications of diabetes. Patient instructed on proper foot hygeine. We discussed wearing proper shoe gear, daily foot inspections, never walking without protective shoe gear, never putting sharp instruments to feet    With patient's permission, nails were aggressively reduced and debrided x 10 to their soft tissue attachment mechanically and with  electric , removing all offending nail and debris.  Also, a sterile #15 scalpel was used to trim lesions x 2 down to smooth appearing skin without incident.  Patient relates relief following the procedure. She will continue to monitor the areas daily, inspect her feet, wear protective shoe gear when ambulatory, moisturizer to maintain skin integrity and follow in this office in approximately 3 months, sooner p.r.n.      Sandoval Pierre DPM

## 2024-12-16 ENCOUNTER — OFFICE VISIT (OUTPATIENT)
Dept: PODIATRY | Facility: CLINIC | Age: 74
End: 2024-12-16
Payer: MEDICARE

## 2024-12-16 VITALS — WEIGHT: 254 LBS | BODY MASS INDEX: 42.32 KG/M2 | HEIGHT: 65 IN

## 2024-12-16 DIAGNOSIS — R60.0 PERIPHERAL EDEMA: ICD-10-CM

## 2024-12-16 DIAGNOSIS — E66.01 MORBID OBESITY WITH BMI OF 40.0-44.9, ADULT: ICD-10-CM

## 2024-12-16 DIAGNOSIS — B35.1 ONYCHOMYCOSIS DUE TO DERMATOPHYTE: ICD-10-CM

## 2024-12-16 DIAGNOSIS — E11.42 DIABETIC POLYNEUROPATHY ASSOCIATED WITH TYPE 2 DIABETES MELLITUS: Primary | ICD-10-CM

## 2024-12-16 DIAGNOSIS — I87.2 VENOUS INSUFFICIENCY: ICD-10-CM

## 2024-12-16 PROCEDURE — 11721 DEBRIDE NAIL 6 OR MORE: CPT | Mod: Q9,S$GLB,, | Performed by: PODIATRIST

## 2024-12-16 PROCEDURE — 99499 UNLISTED E&M SERVICE: CPT | Mod: S$GLB,,, | Performed by: PODIATRIST

## 2024-12-16 PROCEDURE — 99999 PR PBB SHADOW E&M-EST. PATIENT-LVL III: CPT | Mod: PBBFAC,,, | Performed by: PODIATRIST

## 2024-12-16 NOTE — PROGRESS NOTES
Subjective:      Patient ID: Alana Snow is a 74 y.o. female.    Chief Complaint: Diabetes Mellitus and Nail Care (DM 3 month nail care)      Alana is a 74 y.o. female who presents to the clinic for evaluation and treatment of diabetic feet. Alana has a past medical history of AVM (arteriovenous malformation) of small bowel, acquired, Cataract, Colon polyp (01/11/2016), Coronary artery disease, Diabetes mellitus, type 2, Diverticulitis, DJD (degenerative joint disease) of knee, DJD (degenerative joint disease) of knee (3/31/2016), Drop foot gait, Hypertension, Internal hemorrhoid (01/11/2016), and Plantar fasciitis. Patient relates having toenails that are in need of trimming.  Denies being painful with wearing shoe gear.  Has not attempted to self trim.  Continues with good control over her blood glucose.  Denies any additional pedal complaints.       PCP: Sathish Johnson MD    Date Last Seen by PCP: 12/24    Hemoglobin A1C   Date Value Ref Range Status   08/07/2023 5.5 0.0 - 5.6 % Final     Comment:     Reference Interval:  5.0 - 5.6 Normal   5.7 - 6.4 High Risk   > 6.5 Diabetic      Hgb A1c results are standardized based on the (NGSP) National   Glycohemoglobin Standardization Program.      Hemoglobin A1C levels are related to mean serum/plasma glucose   during the preceding 2-3 months.        02/28/2023 6.8 (H) 0.0 - 5.6 % Final     Comment:     Reference Interval:  5.0 - 5.6 Normal   5.7 - 6.4 High Risk   > 6.5 Diabetic      Hgb A1c results are standardized based on the (NGSP) National   Glycohemoglobin Standardization Program.      Hemoglobin A1C levels are related to mean serum/plasma glucose   during the preceding 2-3 months.        10/25/2022 7.3 (H) 0.0 - 5.6 % Final     Comment:     Reference Interval:  5.0 - 5.6 Normal   5.7 - 6.4 High Risk   > 6.5 Diabetic      Hgb A1c results are standardized based on the (NGSP) National   Glycohemoglobin Standardization Program.      Hemoglobin A1C  levels are related to mean serum/plasma glucose   during the preceding 2-3 months.                Past Medical History:   Diagnosis Date    AVM (arteriovenous malformation) of small bowel, acquired     Cataract     Colon polyp 01/11/2016    Coronary artery disease     Diabetes mellitus, type 2     Diverticulitis     DJD (degenerative joint disease) of knee     left    DJD (degenerative joint disease) of knee 3/31/2016    Drop foot gait     right    Hypertension     Internal hemorrhoid 01/11/2016    Plantar fasciitis        Past Surgical History:   Procedure Laterality Date    ABDOMINAL SURGERY      Cholecystectomy    BREAST BIOPSY Right     excisional over 10 yrs. benign    BREAST CYST ASPIRATION      CARPAL TUNNEL RELEASE Left 6/26/10    per Dr. Ramirez    CARPAL TUNNEL RELEASE Right 8/14/10    per Dr. Ramirez    COLON SURGERY      sigmoidectomy    COLONOSCOPY N/A 1/11/2016    Procedure: COLONOSCOPY;  Surgeon: Blair López MD;  Location: [facility], [place] ENDO;  Service: Endoscopy;  Laterality: N/A;    COLONOSCOPY N/A 3/24/2021    Procedure: COLONOSCOPY;  Surgeon: Blair López MD;  Location: [facility] [place] ENDO;  Service: Endoscopy;  Laterality: N/A;    CORONARY STENT PLACEMENT      PILONIDAL CYST DRAINAGE      WRIST FRACTURE SURGERY Right 01/10/2018    clsd reduction w/application ex-fix       Family History   Problem Relation Name Age of Onset    Diabetes Mother      Cancer Father      Coronary artery disease Father      Breast cancer Paternal Grandmother          age unknown    Breast cancer Paternal Aunt          age unknown       Social History     Socioeconomic History    Marital status:    Tobacco Use    Smoking status: Never    Smokeless tobacco: Never   Substance and Sexual Activity    Alcohol use: No     Alcohol/week: 0.0 standard drinks of alcohol    Drug use: No     Social Drivers of Health     Financial Resource Strain: Medium Risk (11/26/2024)    Received from [facility] and Its Subsidiaries and Affiliates    Overall Financial Resource Strain (CARDIA)     Difficulty of Paying Living Expenses: Somewhat hard   Food Insecurity: Food Insecurity Present (11/26/2024)    Received from [facility] and Its Subsidiaries and Affiliates    Hunger Vital Sign     Worried About Running Out of Food in the Last Year: Sometimes true     Ran Out of Food in the Last Year: Sometimes true   Transportation Needs: No Transportation Needs (11/26/2024)    [facility]    PRAPARE - Transportation     Lack of Transportation (Medical): No     Lack of Transportation (Non-Medical): No   Physical Activity: Sufficiently Active (11/26/2024)    [facility]    Exercise Vital Sign     Days of Exercise per Week: 3 days     Minutes of Exercise per Session: 60 min   Recent Concern: Physical Activity - Insufficiently Active (9/3/2024)    Received from [facility]    Exercise Vital Sign     Days of Exercise per Week: 3 days     Minutes of Exercise per Session: 10 min   Stress: No Stress Concern Present (11/26/2024)    Received from [facility] and Its Subsidiaries and Affiliates    [employer] [place] [employer] - Occupational Stress Questionnaire     Feeling of Stress : Not at all   Housing Stability: Low Risk  (11/26/2024)    Received from [facility] and Its Subsidiaries and Affiliates    Housing Stability Vital Sign     Unable to Pay for Housing in the Last Year: No     Number of Times Moved in the Last Year: 0     Homeless in the Last Year: No       Current Outpatient Medications   Medication Sig Dispense Refill    alcohol swabs (BD ALCOHOL SWABS) PadM Apply 1 each topically 3 (three) times daily. 200 each 6    aspirin  "81 mg Cap       atorvastatin (LIPITOR) 40 MG tablet TAKE 1 TABLET (40 MG TOTAL) BY MOUTH ONCE DAILY. 90 tablet 1    blood sugar diagnostic (TRUE METRIX GLUCOSE TEST STRIP) Str CHECK BLOOD SUGAR THREE TIMES DAILY 300 strip 6    cholecalciferol, vitamin D3, 125 mcg (5,000 unit) Tab Take 1 tablet by mouth every morning.      citalopram (CELEXA) 20 MG tablet TAKE 1 TABLET EVERY DAY 90 tablet 1    clopidogreL (PLAVIX) 75 mg tablet TAKE 1 TABLET (75 MG TOTAL) BY MOUTH ONCE DAILY. 90 tablet 0    lancets (TRUEPLUS LANCETS) 28 gauge Misc Take glucose TID. 200 each 6    lansoprazole (PREVACID) 30 MG capsule TAKE 1 CAPSULE EVERY DAY 90 capsule 1    LEVOXYL 50 mcg tablet Take 50 mcg by mouth before breakfast.      METANX, ALGAL OIL, 3 mg-35 mg-2 mg -90.314 mg Cap Take 1 capsule by mouth 2 (two) times daily. 180 capsule 3    pen needle, diabetic (SURE COMFORT PEN NEEDLE) 32 gauge x 5/32" Ndle use as directed with lantus 200 each 6    tirzepatide 7.5 mg/0.5 mL PnIj Inject 7.5 mg into the skin every 7 days. 12 pen 2    blood-glucose meter (TRUE METRIX AIR GLUCOSE METER) Misc Use as directed to check blood sugar. 1 each 0    cabergoline (DOSTINEX) 0.5 mg tablet Take by mouth. Take half of a table twice weekly.      risedronate (ACTONEL) 150 MG Tab Take 150 mg by mouth every 30 days.       No current facility-administered medications for this visit.       Review of patient's allergies indicates:   Allergen Reactions    Bacitracin Dermatitis     Tested positive for contact allergy to bacitracin (should also avoid neomycin, and polymixin if possible)--marino Obrien, 2015    Balsam peru Rash     Tested positive for contact allergy to Balsam of peru,used in flavorings and fragrances--had very pruritic, extensive rash but not clear which products provoked it.  marino Obrien, 2015    Epoxy resin Rash     Tested positive for contact allergy to epoxy resin--also to Carba mix, involved in rubber mfg--but couldn't pull that up     " Ethylenediamine (bulk) Rash     Tested positive for contact allergy to ethylenediamine dihydrochloride, Dr Smalls Summer, 2015--after prolonged pruritic rash---often present in fungicides, antibiotic creams, eye and nose drops.    Gold sodium thiosulfate Rash     Tested positive for contact allergy by Dr Smalls summer, 2015--after having severe pruritic rashes    Latex, natural rubber Rash    Metformin Diarrhea     Really affected things    Sulfa (sulfonamide antibiotics) Other (See Comments)     Affects liver    Tixocortol pivalate Rash     --tested positive for contact allergy to tixocortol-21-pivalate, commonly found in topical anti-inflammatory products and related corticosteroids    Chromium and derivatives Rash    Gold salts     Losartan Rash         Review of Systems   Constitutional: Negative for chills and fever.   Cardiovascular:  Positive for leg swelling. Negative for claudication.   Skin:  Positive for color change and nail changes.   Musculoskeletal:  Positive for joint swelling. Negative for joint pain, muscle cramps, muscle weakness and myalgias.   Neurological:  Positive for numbness. Negative for paresthesias.   Psychiatric/Behavioral:  Negative for altered mental status.            Objective:      Physical Exam  Constitutional:       General: She is not in acute distress.     Appearance: She is well-developed. She is not diaphoretic.   Cardiovascular:      Pulses:           Dorsalis pedis pulses are 2+ on the right side and 2+ on the left side.        Posterior tibial pulses are 2+ on the right side and 2+ on the left side.      Comments: CFT is < 3 seconds bilateral.  Pedal hair growth is decreased bilateral.  Varicosities noted bilateral.  Toes are cool to touch bilateral.    Musculoskeletal:         General: Tenderness present.      Right lower le+ Edema present.      Left lower le+ Edema present.      Comments: Muscle strength 5/5 in all muscle groups bilateral.  No tenderness nor  crepitation with ROM of foot/ankle joints bilateral.  Bilateral pes planus foot type.  Rectus alignment of all toes bilateral.     Skin:     General: Skin is warm and dry.      Capillary Refill: Capillary refill takes 2 to 3 seconds.      Coloration: Skin is not pale.      Findings: Lesion present. No abrasion, bruising, burn, ecchymosis, erythema, laceration, rash or wound.      Comments: Pedal skin appears edematous bilateral.  Toenails x 10 appear thickened by 2 mm, elongated by 6 mm, and discolored with subungual debris.     Neurological:      Mental Status: She is alert and oriented to person, place, and time.      Sensory: Sensory deficit present.      Motor: Motor function is intact. No weakness or atrophy.      Comments: Protective sensation per [place]-Justyna monofilament is intact bilateral.  Vibratory sensation is decreased bilateral.  Light touch is intact bilateral.               Assessment:       Encounter Diagnoses   Name Primary?    Diabetic polyneuropathy associated with type 2 diabetes mellitus Yes    Venous insufficiency     Peripheral edema     Onychomycosis due to dermatophyte     Morbid obesity with BMI of 40.0-44.9, adult            Plan:       Alana was seen today for diabetes mellitus and nail care.    Diagnoses and all orders for this visit:    Diabetic polyneuropathy associated with type 2 diabetes mellitus    Venous insufficiency    Peripheral edema    Onychomycosis due to dermatophyte    Morbid obesity with BMI of 40.0-44.9, adult        I counseled the patient on her conditions, their implications and medical management.    To continue with compression of the lower extremities.      Advised to inspect for stasis dermatitis and venous blistering of the LEs.      Discussed the importance of diet and exercise as they pertain to diabetes management and maintaining a healthy BMI.     Shoe inspection. Diabetic Foot Education. Patient reminded of the importance of good nutrition and blood  sugar control to help prevent podiatric complications of diabetes. Patient instructed on proper foot hygeine. We discussed wearing proper shoe gear, daily foot inspections, never walking without protective shoe gear, never putting sharp instruments to feet    With patient's permission, nails were aggressively reduced and debrided x 10 to their soft tissue attachment mechanically and with electric , removing all offending nail and debris.  Patient relates relief following the procedure. She will continue to monitor the areas daily, inspect her feet, wear protective shoe gear when ambulatory, moisturizer to maintain skin integrity and follow in this office in approximately 3 months, sooner [email or web address].      Sandoval Pierre DPM

## 2025-04-21 ENCOUNTER — TELEPHONE (OUTPATIENT)
Dept: PODIATRY | Facility: CLINIC | Age: 75
End: 2025-04-21
Payer: MEDICARE

## 2025-04-21 NOTE — TELEPHONE ENCOUNTER
Called patient to let her know that her appointment has to be rescheduled per provider's request. Phone went to voicemail and a message was left for a call back.